# Patient Record
Sex: MALE | Race: WHITE | Employment: FULL TIME | URBAN - METROPOLITAN AREA
[De-identification: names, ages, dates, MRNs, and addresses within clinical notes are randomized per-mention and may not be internally consistent; named-entity substitution may affect disease eponyms.]

---

## 2017-07-25 ENCOUNTER — HOSPITAL ENCOUNTER (OUTPATIENT)
Age: 52
Discharge: HOME | End: 2017-07-25
Payer: COMMERCIAL

## 2017-07-25 DIAGNOSIS — I10 ESSENTIAL HYPERTENSION: ICD-10-CM

## 2017-07-25 LAB
ALBUMIN SERPL-MCNC: 4.1 G/DL (ref 3.5–5.2)
ALBUMIN/GLOBULIN RATIO: ABNORMAL (ref 1–2.5)
ALP BLD-CCNC: 71 U/L (ref 40–129)
ALT SERPL-CCNC: 39 U/L (ref 5–41)
ANION GAP SERPL CALCULATED.3IONS-SCNC: 12 MMOL/L (ref 9–17)
AST SERPL-CCNC: 28 U/L
BILIRUB SERPL-MCNC: 0.42 MG/DL (ref 0.3–1.2)
BUN BLDV-MCNC: 15 MG/DL (ref 6–20)
BUN/CREAT BLD: ABNORMAL (ref 9–20)
CALCIUM SERPL-MCNC: 9.2 MG/DL (ref 8.6–10.4)
CHLORIDE BLD-SCNC: 99 MMOL/L (ref 98–107)
CHOLESTEROL/HDL RATIO: 3.6
CHOLESTEROL: 154 MG/DL
CO2: 28 MMOL/L (ref 20–31)
CREAT SERPL-MCNC: 0.86 MG/DL (ref 0.7–1.2)
GFR AFRICAN AMERICAN: >60 ML/MIN
GFR NON-AFRICAN AMERICAN: >60 ML/MIN
GFR SERPL CREATININE-BSD FRML MDRD: ABNORMAL ML/MIN/{1.73_M2}
GFR SERPL CREATININE-BSD FRML MDRD: ABNORMAL ML/MIN/{1.73_M2}
GLUCOSE BLD-MCNC: 213 MG/DL (ref 70–99)
HCT VFR BLD CALC: 47.1 % (ref 41–53)
HDLC SERPL-MCNC: 43 MG/DL
HEMOGLOBIN: 15.9 G/DL (ref 13.5–17.5)
LDL CHOLESTEROL: 65 MG/DL (ref 0–130)
MCH RBC QN AUTO: 30 PG (ref 26–34)
MCHC RBC AUTO-ENTMCNC: 33.8 G/DL (ref 31–37)
MCV RBC AUTO: 88.9 FL (ref 80–100)
PDW BLD-RTO: 13.1 % (ref 11.5–14.9)
PLATELET # BLD: 191 K/UL (ref 150–450)
PMV BLD AUTO: 9 FL (ref 6–12)
POTASSIUM SERPL-SCNC: 4.8 MMOL/L (ref 3.7–5.3)
RBC # BLD: 5.3 M/UL (ref 4.5–5.9)
SODIUM BLD-SCNC: 139 MMOL/L (ref 135–144)
TOTAL PROTEIN: 7.8 G/DL (ref 6.4–8.3)
TRIGL SERPL-MCNC: 232 MG/DL
VLDLC SERPL CALC-MCNC: ABNORMAL MG/DL (ref 1–30)
WBC # BLD: 6.2 K/UL (ref 3.5–11)

## 2017-07-25 PROCEDURE — 80053 COMPREHEN METABOLIC PANEL: CPT

## 2017-07-25 PROCEDURE — 36415 COLL VENOUS BLD VENIPUNCTURE: CPT

## 2017-07-25 PROCEDURE — 85027 COMPLETE CBC AUTOMATED: CPT

## 2017-07-25 PROCEDURE — 80061 LIPID PANEL: CPT

## 2017-10-02 ENCOUNTER — HOSPITAL ENCOUNTER (OUTPATIENT)
Dept: MRI IMAGING | Facility: CLINIC | Age: 52
Discharge: HOME | End: 2017-10-02
Payer: COMMERCIAL

## 2017-10-02 DIAGNOSIS — M75.101 TEAR OF RIGHT ROTATOR CUFF, UNSPECIFIED TEAR EXTENT: ICD-10-CM

## 2017-10-02 PROCEDURE — 73221 MRI JOINT UPR EXTREM W/O DYE: CPT

## 2017-10-13 DIAGNOSIS — M25.511 RIGHT SHOULDER PAIN, UNSPECIFIED CHRONICITY: Primary | ICD-10-CM

## 2017-10-18 ENCOUNTER — OFFICE VISIT (OUTPATIENT)
Dept: ORTHOPEDIC SURGERY | Age: 52
End: 2017-10-18
Payer: COMMERCIAL

## 2017-10-18 VITALS
HEIGHT: 72 IN | SYSTOLIC BLOOD PRESSURE: 155 MMHG | BODY MASS INDEX: 39.28 KG/M2 | DIASTOLIC BLOOD PRESSURE: 95 MMHG | WEIGHT: 290 LBS | HEART RATE: 75 BPM

## 2017-10-18 DIAGNOSIS — M75.121 COMPLETE TEAR OF RIGHT ROTATOR CUFF: Primary | ICD-10-CM

## 2017-10-18 DIAGNOSIS — M75.41 IMPINGEMENT SYNDROME OF RIGHT SHOULDER: ICD-10-CM

## 2017-10-18 DIAGNOSIS — M19.019 AC (ACROMIOCLAVICULAR) JOINT ARTHRITIS: ICD-10-CM

## 2017-10-18 PROCEDURE — 99243 OFF/OP CNSLTJ NEW/EST LOW 30: CPT | Performed by: ORTHOPAEDIC SURGERY

## 2017-10-18 RX ORDER — IBUPROFEN 800 MG/1
800 TABLET ORAL EVERY 6 HOURS PRN
COMMUNITY
End: 2019-02-08 | Stop reason: SDUPTHER

## 2017-10-18 RX ORDER — CYCLOBENZAPRINE HCL 10 MG
10 TABLET ORAL 3 TIMES DAILY PRN
COMMUNITY
End: 2018-01-05 | Stop reason: ALTCHOICE

## 2017-10-18 ASSESSMENT — PATIENT HEALTH QUESTIONNAIRE - PHQ9
SUM OF ALL RESPONSES TO PHQ QUESTIONS 1-9: 0
1. LITTLE INTEREST OR PLEASURE IN DOING THINGS: 0
2. FEELING DOWN, DEPRESSED OR HOPELESS: 0
SUM OF ALL RESPONSES TO PHQ9 QUESTIONS 1 & 2: 0

## 2017-10-18 NOTE — LETTER
Dr. Jeni Garcia  [address] [facility] [address]  Dept: [phone]  Dept Fax: [phone]    10/19/17    Patient: Lorinda Meckel  YOB: 1965    Dear Dr. Ariadna Elizabeth,    I had the pleasure of seeing one of your patients, Graeme Rice today in the office. Below are the relevant portions of my assessment and plan of care. IMPRESSION:   1. Complete tear of right rotator cuff    2. AC (acromioclavicular) joint arthritis    3. Impingement syndrome of right shoulder      PLAN:   I reviewed the patient's physical exam and radiographic findings with him. We discussed right shoulder rotator cuff tear and treatment options. They can include anti-inflammatories, corticosteroid injections, physical therapy, activity modification, and surgical intervention. Patient would like to try nonoperative treatment of possible and we will submit a C9 to allow for the additional diagnosis of right shoulder impingement syndrome, right shoulder before meals arthritis, and right rotator cuff tear. In addition we're going to ask with the C9 for permission to do a corticosteroid injection. Work restrictions have been written for no repetitive over the shoulder activities and no more than 15-20 pounds lifting with the right upper extremity. Patient is going to continue with the anti-inflammatory medications and we will ask worker's comp to approve physical therapy. Thank you for allowing me to participate in the care of this patient. I will keep you updated on this patient's follow up and I look forward to serving you and your patients again in the future. Please don't hesitate to contact me at my mobile number [phone].       Malgorzata Shepherd

## 2017-10-18 NOTE — PROGRESS NOTES
[facility]  [facility]  [address] [place], [place] [address] [address] [place] [address]  Dept: [phone]    Ambulatory Orthopedic Consult      CHIEF COMPLAINT:    Chief Complaint   Patient presents with    Shoulder Pain     Right shoulder       HISTORY OF PRESENT ILLNESS:      The patient is a 47 y.o. left hand dominant male who is being seen at the request of   from [facility] for consultation and evaluation of right shoulder pain. The patient sustained an injury at work on 9/6/2017. He was picking up a 20 pound piece of beef and heard a pop to his shoulder. He has had shoulder pain ever since. He is currently working at work with no right-handed work. He did taking ibuprofen for pain and Flexeril at night. He's having trouble raising his arm over his head. He has a previous history of left shoulder rotator cuff repair by Dr. Alli Al and she had a previous right shoulder injection about 5 years ago by Dr. Alvarenga Argue marker was significant improvement in his symptoms at that time. Past Medical History:    Past Medical History:   Diagnosis Date    Abscess of axilla     Asthma     HTN (hypertension) 11/14/2013    Sciatica 3/18/2014       Past Surgical History:    Past Surgical History:   Procedure Laterality Date    ROTATOR CUFF REPAIR      L arm       Current Medications:   Current Outpatient Prescriptions   Medication Sig Dispense Refill    cyclobenzaprine (FLEXERIL) 10 MG tablet Take 10 mg by mouth 3 times daily as needed for Muscle spasms      ibuprofen (ADVIL;MOTRIN) 800 MG tablet Take 800 mg by mouth every 6 hours as needed for Pain      lisinopril (PRINIVIL;ZESTRIL) 20 MG tablet Take 1 tablet by mouth daily 30 tablet 3    SITagliptin-metFORMIN (JANUMET XR)  MG TB24 per extended release tablet Take 1 tablet by mouth daily 30 tablet 3    Compression Stockings MISC by Does not apply route.  Wear PRN for comfort 1 each 0     No current facility-administered medications reveals no significant shoulder girdle muscle atrophy, erythema, warmth, skin lesions, signs of infection. Tenderness to the anterolateral aspect of the shoulder is appreciated. No palpable deformity is noted. Active flexion is 100 degrees. Active functional reach is  Right flank  . Active abduction is 100 degrees. External rotation is 40. Internal rotation is 20 . A  PositiveHawkins test is appreciated. Positive supraspinatus test is appreciated. A positive impingement test is noted. No gross shoulder instability is appreciated. A negative apprehension test is noted. Negative Rabun's test is appreciated. Rotator cuff muscle strength testing shows weakness with external rotationstrength testing. Sensation to C5, C6, C7, C8, T1 dermatomes appears to be intact and symmetric bilaterally. Patient has full range of motion of the cervical spine and elbow.     Radiology:     Narrative   EXAMINATION:   MRI OF THE RIGHT SHOULDER WITHOUT CONTRAST   10/2/2017 2:32 pm       TECHNIQUE:   Multiplanar multisequence MRI of the right shoulder was performed without the   administration of intravenous contrast.       COMPARISON:   None.       HISTORY:   ORDERING SYSTEM PROVIDED HISTORY: Tear of right rotator cuff, unspecified   tear extent   TECHNOLOGIST PROVIDED HISTORY:   Reason for exam:->right shoulder pain   Ordering Physician Provided Reason for Exam: rt shoulder pain/strain   Acuity: Acute   Type of Exam: Initial       FINDINGS:   Examination is significant for a full-thickness, essentially complete tear of   the supraspinatus tendon spanning approximately 2 cm in anterior-posterior   dimension with approximately 1.5 cm of retraction.  The retracted tendon   demonstrates diffuse tendinopathy and intrasubstance tearing.  The   supraspinatus muscle maintains normal bulk and signal intensity, without   significant fatty infiltration.  Infraspinatus tendinopathy and   intrasubstance tearing noted without

## 2017-10-19 ASSESSMENT — ENCOUNTER SYMPTOMS
SHORTNESS OF BREATH: 0
ABDOMINAL PAIN: 0
ROS SKIN COMMENTS: NEGATIVE FOR RASH
EYE DISCHARGE: 0

## 2017-10-20 ENCOUNTER — TELEPHONE (OUTPATIENT)
Dept: ORTHOPEDIC SURGERY | Age: 52
End: 2017-10-20

## 2017-10-20 NOTE — TELEPHONE ENCOUNTER
Tommy Stuart from [facility] ask that we send recent notes, C9 for diagnosis and injections. Patient contacted her to let her know he was going to have these.    Her fax number is [phone]    Phone number is [phone]  His claim number is [id number]

## 2017-10-27 ENCOUNTER — HOSPITAL ENCOUNTER (OUTPATIENT)
Age: 52
Setting detail: SPECIMEN
Discharge: HOME | End: 2017-10-27
Payer: COMMERCIAL

## 2017-10-27 DIAGNOSIS — E11.9 TYPE 2 DIABETES MELLITUS WITHOUT COMPLICATION, WITHOUT LONG-TERM CURRENT USE OF INSULIN (HCC): ICD-10-CM

## 2017-10-27 LAB
CREATININE URINE: 111.2 MG/DL (ref 39–259)
MICROALBUMIN/CREAT 24H UR: <12 MG/L
MICROALBUMIN/CREAT UR-RTO: NORMAL MCG/MG CREAT

## 2017-11-01 ENCOUNTER — OFFICE VISIT (OUTPATIENT)
Dept: ORTHOPEDIC SURGERY | Age: 52
End: 2017-11-01
Payer: COMMERCIAL

## 2017-11-01 VITALS — HEIGHT: 72 IN | WEIGHT: 287.92 LBS | BODY MASS INDEX: 39 KG/M2

## 2017-11-01 DIAGNOSIS — M19.019 AC (ACROMIOCLAVICULAR) JOINT ARTHRITIS: ICD-10-CM

## 2017-11-01 DIAGNOSIS — M75.41 IMPINGEMENT SYNDROME OF RIGHT SHOULDER: ICD-10-CM

## 2017-11-01 DIAGNOSIS — M75.121 COMPLETE TEAR OF RIGHT ROTATOR CUFF: Primary | ICD-10-CM

## 2017-11-01 PROCEDURE — 20610 DRAIN/INJ JOINT/BURSA W/O US: CPT | Performed by: ORTHOPAEDIC SURGERY

## 2017-11-01 RX ORDER — BUPIVACAINE HYDROCHLORIDE 2.5 MG/ML
2 INJECTION, SOLUTION INFILTRATION; PERINEURAL ONCE
Status: COMPLETED | OUTPATIENT
Start: 2017-11-01 | End: 2017-11-01

## 2017-11-01 RX ORDER — METHYLPREDNISOLONE ACETATE 80 MG/ML
80 INJECTION, SUSPENSION INTRA-ARTICULAR; INTRALESIONAL; INTRAMUSCULAR; SOFT TISSUE ONCE
Status: COMPLETED | OUTPATIENT
Start: 2017-11-01 | End: 2017-11-01

## 2017-11-01 RX ORDER — IBUPROFEN 800 MG/1
800 TABLET ORAL EVERY 8 HOURS PRN
Qty: 90 TABLET | Refills: 2 | Status: SHIPPED | OUTPATIENT
Start: 2017-11-01 | End: 2018-01-09 | Stop reason: SDUPTHER

## 2017-11-01 RX ADMIN — BUPIVACAINE HYDROCHLORIDE 5 MG: 2.5 INJECTION, SOLUTION INFILTRATION; PERINEURAL at 15:47

## 2017-11-01 RX ADMIN — METHYLPREDNISOLONE ACETATE 80 MG: 80 INJECTION, SUSPENSION INTRA-ARTICULAR; INTRALESIONAL; INTRAMUSCULAR; SOFT TISSUE at 15:48

## 2017-11-01 ASSESSMENT — ENCOUNTER SYMPTOMS
DIARRHEA: 0
CONSTIPATION: 0
TROUBLE SWALLOWING: 0
NAUSEA: 0
SHORTNESS OF BREATH: 0
VOMITING: 0
VOICE CHANGE: 0
CHOKING: 0
COUGH: 0
WHEEZING: 0
ABDOMINAL PAIN: 0

## 2017-11-01 NOTE — PROGRESS NOTES
[address] [place] [address]  Dept: [phone]  Dept Fax: [phone]        Ambulatory Follow Up      Subjective:   Robson Dong is a 47y.o. year old male who presents to our office today for routine followup regarding his   1. Complete tear of right rotator cuff    2. Impingement syndrome of right shoulder    3. AC (acromioclavicular) joint arthritis    . Chief Complaint   Patient presents with    Shoulder Pain     right DOI/[facility] 9/6/2017       HPI-Patient is here today for a right shoulder cortisone injection that was approved by workers comp. Patient initial date of injury was 09/06/2017. Patient has been on light duty since the injury that includes no repitive motion, no overhead activity and a 15-20 pound limit to right upper extremity. Patient takes motrin as needed, especially at work if he is working a lot. Patient rates pain 4/10. He has had some improvement so far and is looking forward to the corticosteroid injection today. Review of Systems   Constitutional: Negative for fever. HENT: Negative for ear discharge, ear pain, hearing loss, tinnitus, trouble swallowing and voice change. Respiratory: Negative for cough, choking, shortness of breath and wheezing. Cardiovascular: Negative for chest pain, palpitations and leg swelling. Gastrointestinal: Negative for abdominal pain, constipation, diarrhea, nausea and vomiting. Musculoskeletal: Positive for arthralgias (rt shoulder). Neurological: Negative for dizziness. I have reviewed the CC, HPI, ROS, PMH, FHX, Social History. I agree with the documentation provided by other staff, residents and have reviewed their documentation prior to providing my signature indicating agreement. Objective :   Ht 6' 0.01\" (1.829 m)   Wt 287 lb 14.7 oz (130.6 kg)   BMI 39.04 kg/m²  Body mass index is 39.04 kg/m².   General: Robson Dong is a 47 y.o. male who is alert and oriented and sitting comfortably in our office. Ortho Exam  MS:  No outward deformity to the right shoulder is appreciated. There is no significant effusion to the right shoulder and no signs of infection to the right shoulder. Neuro: alert. oriented  Eyes: Extra-ocular muscles intact  Mouth: Oral mucosa moist. No perioral lesions  Pulm: Respirations unlabored and regular. Skin: warm, well perfused  Psych:   Patient has good fund of knowledge and displays understanging of exam, diagnosis, and plan. SHOULDER INJECTION PROCEDURE NOTE:  The patient was identified. The right shoulder was confirmed with the patient. After a sterile prep with Betadine the shoulder  was injected using a posterior approach to the subacromial space with a mixture of 2 mL of 0.25% Marcaine and 80 mg of Depo-Medrol. Patient tolerated the procedure well without post injection complications. I instructed the patient to call our office immediately if they have any swelling or increased pain at the injection site. Assessment:      1. Complete tear of right rotator cuff    2. Impingement syndrome of right shoulder    3. AC (acromioclavicular) joint arthritis       Plan:      The patient tolerated a subacromial corticosteroid injection to the right shoulder today without complications. Patient to continue with HEP and therapy. Follow up in 6 weeks. Follow up:Return in about 6 weeks (around 12/13/2017). Renae Ortiz LPN am scribing for and in the presence of Dr Joaquim Moreland.   11/6/2017  10:21 AM            Orders Placed This Encounter   Medications    ibuprofen (ADVIL;MOTRIN) 800 MG tablet     Sig: Take 1 tablet by mouth every 8 hours as needed for Pain     Dispense:  90 tablet     Refill:  2    methylPREDNISolone acetate (DEPO-MEDROL) injection 80 mg    bupivacaine (MARCAINE) 0.25 % injection 5 mg          Orders Placed This Encounter   Procedures    WA ARTHROCENTESIS ASPIR&/INJ MAJOR JT/BURSA W/O US       I

## 2017-12-08 ENCOUNTER — OFFICE VISIT (OUTPATIENT)
Dept: ORTHOPEDIC SURGERY | Age: 52
End: 2017-12-08
Payer: COMMERCIAL

## 2017-12-08 VITALS — WEIGHT: 292 LBS | BODY MASS INDEX: 39.55 KG/M2 | HEIGHT: 72 IN

## 2017-12-08 DIAGNOSIS — M75.01 ADHESIVE CAPSULITIS OF RIGHT SHOULDER: ICD-10-CM

## 2017-12-08 DIAGNOSIS — M75.121 COMPLETE TEAR OF RIGHT ROTATOR CUFF: Primary | ICD-10-CM

## 2017-12-08 DIAGNOSIS — M75.41 IMPINGEMENT SYNDROME OF RIGHT SHOULDER: ICD-10-CM

## 2017-12-08 PROCEDURE — 99213 OFFICE O/P EST LOW 20 MIN: CPT | Performed by: ORTHOPAEDIC SURGERY

## 2017-12-08 ASSESSMENT — ENCOUNTER SYMPTOMS
NAUSEA: 0
VOICE CHANGE: 0
DIARRHEA: 0
ABDOMINAL PAIN: 0
TROUBLE SWALLOWING: 0
CHOKING: 0
SHORTNESS OF BREATH: 0
CONSTIPATION: 0
WHEEZING: 0
COUGH: 0
VOMITING: 0

## 2018-01-05 ENCOUNTER — OFFICE VISIT (OUTPATIENT)
Dept: ORTHOPEDIC SURGERY | Age: 53
End: 2018-01-05
Payer: COMMERCIAL

## 2018-01-05 VITALS
BODY MASS INDEX: 39.55 KG/M2 | SYSTOLIC BLOOD PRESSURE: 138 MMHG | WEIGHT: 292 LBS | DIASTOLIC BLOOD PRESSURE: 92 MMHG | HEIGHT: 72 IN

## 2018-01-05 DIAGNOSIS — M75.01 ADHESIVE CAPSULITIS OF RIGHT SHOULDER: ICD-10-CM

## 2018-01-05 DIAGNOSIS — M75.121 COMPLETE TEAR OF RIGHT ROTATOR CUFF: Primary | ICD-10-CM

## 2018-01-05 DIAGNOSIS — M75.41 IMPINGEMENT SYNDROME OF RIGHT SHOULDER: ICD-10-CM

## 2018-01-05 DIAGNOSIS — Z01.818 PRE-OP TESTING: ICD-10-CM

## 2018-01-05 PROCEDURE — 99213 OFFICE O/P EST LOW 20 MIN: CPT | Performed by: ORTHOPAEDIC SURGERY

## 2018-01-05 ASSESSMENT — ENCOUNTER SYMPTOMS
NAUSEA: 0
CONSTIPATION: 0
COUGH: 0
DIARRHEA: 0

## 2018-01-05 NOTE — PROGRESS NOTES
is appreciated. Positive supraspinatus test is appreciated. A positive impingement test is noted. No gross shoulder instability is appreciated. A negative apprehension test is noted. Negative Buckingham's test is appreciated. Rotator cuff muscle strength testing shows weakness with external rotationstrength testing. Sensation to C5, C6, C7, C8, T1 dermatomes appears to be intact and symmetric bilaterally. Patient has full range of motion of the cervical spine and elbow. Neuro: alert. oriented  Eyes: Extra-ocular muscles intact  Mouth: Oral mucosa moist. No perioral lesions  Pulm: Respirations unlabored and regular. Skin: warm, well perfused  Psych:   Patient has good fund of knowledge and displays understanging of exam, diagnosis, and plan. Assessment:      1. Complete tear of right rotator cuff    2. Impingement syndrome of right shoulder    3. Adhesive capsulitis of right shoulder    4. Pre-op testing       Plan:      After exhausting all conservative measures with physical therapy, antiinflammatories, and corticosteroid injection patient would like to go forth with right shoulder arthroscopy. Follow up two weeks post op. The risks/benefits, alternatives, and potential complications have been discussed with the patient. We also had a long discussion regarding the procedure itself and the expectations of the recovery. All question and concerns were addressed. No guarantees were made. The patient was instructed to call our office if any questions or concerns. Follow up: Two weeks post operatively. Liam Benoit RN am scribing for and in the presence of Dr. Jamal Bernal  1/8/2018 10:10 AM      No orders of the defined types were placed in this encounter. I have reviewed and made changes accordingly to the work scribed by Lilliam Chand RN. The documentation accurately reflects work and decisions made by me.   I have also reviewed documentation completed

## 2018-01-09 ENCOUNTER — HOSPITAL ENCOUNTER (OUTPATIENT)
Dept: GENERAL RADIOLOGY | Age: 53
Discharge: HOME | End: 2018-01-09
Payer: COMMERCIAL

## 2018-01-09 ENCOUNTER — HOSPITAL ENCOUNTER (OUTPATIENT)
Dept: PREADMISSION TESTING | Age: 53
Discharge: HOME | End: 2018-01-09
Payer: COMMERCIAL

## 2018-01-09 VITALS
OXYGEN SATURATION: 95 % | DIASTOLIC BLOOD PRESSURE: 96 MMHG | HEIGHT: 72 IN | WEIGHT: 289.24 LBS | BODY MASS INDEX: 39.18 KG/M2 | RESPIRATION RATE: 20 BRPM | TEMPERATURE: 98.5 F | HEART RATE: 83 BPM | SYSTOLIC BLOOD PRESSURE: 155 MMHG

## 2018-01-09 DIAGNOSIS — Z01.818 PRE-OP TESTING: ICD-10-CM

## 2018-01-09 LAB
ALBUMIN SERPL-MCNC: 4.3 G/DL (ref 3.5–5.2)
ALBUMIN/GLOBULIN RATIO: 1.3 (ref 1–2.5)
ALP BLD-CCNC: 55 U/L (ref 40–129)
ALT SERPL-CCNC: 41 U/L (ref 5–41)
ANION GAP SERPL CALCULATED.3IONS-SCNC: 15 MMOL/L (ref 9–17)
AST SERPL-CCNC: 32 U/L
BILIRUB SERPL-MCNC: 0.58 MG/DL (ref 0.3–1.2)
BILIRUBIN URINE: NEGATIVE
BUN BLDV-MCNC: 15 MG/DL (ref 6–20)
BUN/CREAT BLD: ABNORMAL (ref 9–20)
CALCIUM SERPL-MCNC: 9.5 MG/DL (ref 8.6–10.4)
CHLORIDE BLD-SCNC: 97 MMOL/L (ref 98–107)
CO2: 26 MMOL/L (ref 20–31)
COLOR: YELLOW
COMMENT UA: NORMAL
CREAT SERPL-MCNC: 0.92 MG/DL (ref 0.7–1.2)
EKG ATRIAL RATE: 77 BPM
EKG P AXIS: 55 DEGREES
EKG P-R INTERVAL: 148 MS
EKG Q-T INTERVAL: 384 MS
EKG QRS DURATION: 106 MS
EKG QTC CALCULATION (BAZETT): 434 MS
EKG R AXIS: 8 DEGREES
EKG T AXIS: 35 DEGREES
EKG VENTRICULAR RATE: 77 BPM
GFR AFRICAN AMERICAN: >60 ML/MIN
GFR NON-AFRICAN AMERICAN: >60 ML/MIN
GFR SERPL CREATININE-BSD FRML MDRD: ABNORMAL ML/MIN/{1.73_M2}
GFR SERPL CREATININE-BSD FRML MDRD: ABNORMAL ML/MIN/{1.73_M2}
GLUCOSE BLD-MCNC: 192 MG/DL (ref 70–99)
GLUCOSE URINE: NEGATIVE
HCT VFR BLD CALC: 45.5 % (ref 40.7–50.3)
HEMOGLOBIN: 15.2 G/DL (ref 13–17)
KETONES, URINE: NEGATIVE
LEUKOCYTE ESTERASE, URINE: NEGATIVE
MCH RBC QN AUTO: 29.6 PG (ref 25.2–33.5)
MCHC RBC AUTO-ENTMCNC: 33.4 G/DL (ref 28.4–34.8)
MCV RBC AUTO: 88.5 FL (ref 82.6–102.9)
NITRITE, URINE: NEGATIVE
PDW BLD-RTO: 12.6 % (ref 11.8–14.4)
PH UA: 5 (ref 5–8)
PLATELET # BLD: 212 K/UL (ref 138–453)
PMV BLD AUTO: 10.5 FL (ref 8.1–13.5)
POTASSIUM SERPL-SCNC: 4.2 MMOL/L (ref 3.7–5.3)
PROTEIN UA: NEGATIVE
RBC # BLD: 5.14 M/UL (ref 4.21–5.77)
SODIUM BLD-SCNC: 138 MMOL/L (ref 135–144)
SPECIFIC GRAVITY UA: 1.01 (ref 1–1.03)
TOTAL PROTEIN: 7.6 G/DL (ref 6.4–8.3)
TURBIDITY: CLEAR
URINE HGB: NEGATIVE
UROBILINOGEN, URINE: NORMAL
WBC # BLD: 7.4 K/UL (ref 3.5–11.3)

## 2018-01-09 PROCEDURE — 71046 X-RAY EXAM CHEST 2 VIEWS: CPT

## 2018-01-09 PROCEDURE — 85027 COMPLETE CBC AUTOMATED: CPT

## 2018-01-09 PROCEDURE — 81003 URINALYSIS AUTO W/O SCOPE: CPT

## 2018-01-09 PROCEDURE — 93005 ELECTROCARDIOGRAM TRACING: CPT

## 2018-01-09 PROCEDURE — 36415 COLL VENOUS BLD VENIPUNCTURE: CPT

## 2018-01-09 PROCEDURE — 80053 COMPREHEN METABOLIC PANEL: CPT

## 2018-01-09 RX ORDER — SODIUM CHLORIDE, SODIUM LACTATE, POTASSIUM CHLORIDE, CALCIUM CHLORIDE 600; 310; 30; 20 MG/100ML; MG/100ML; MG/100ML; MG/100ML
1000 INJECTION, SOLUTION INTRAVENOUS CONTINUOUS
Status: CANCELLED | OUTPATIENT
Start: 2018-01-09

## 2018-01-09 ASSESSMENT — PAIN DESCRIPTION - FREQUENCY: FREQUENCY: CONTINUOUS

## 2018-01-09 ASSESSMENT — PAIN SCALES - GENERAL: PAINLEVEL_OUTOF10: 5

## 2018-01-09 ASSESSMENT — PAIN DESCRIPTION - LOCATION: LOCATION: SHOULDER

## 2018-01-09 ASSESSMENT — PAIN DESCRIPTION - ORIENTATION: ORIENTATION: RIGHT

## 2018-01-11 ENCOUNTER — ANESTHESIA EVENT (OUTPATIENT)
Dept: OPERATING ROOM | Age: 53
End: 2018-01-11
Payer: COMMERCIAL

## 2018-01-12 ENCOUNTER — ANESTHESIA (OUTPATIENT)
Dept: OPERATING ROOM | Age: 53
End: 2018-01-12
Payer: COMMERCIAL

## 2018-01-12 ENCOUNTER — HOSPITAL ENCOUNTER (OUTPATIENT)
Age: 53
Setting detail: OUTPATIENT SURGERY
Discharge: HOME | End: 2018-01-12
Attending: ORTHOPAEDIC SURGERY | Admitting: ORTHOPAEDIC SURGERY
Payer: COMMERCIAL

## 2018-01-12 VITALS
HEIGHT: 72 IN | BODY MASS INDEX: 38.64 KG/M2 | RESPIRATION RATE: 22 BRPM | DIASTOLIC BLOOD PRESSURE: 88 MMHG | SYSTOLIC BLOOD PRESSURE: 140 MMHG | OXYGEN SATURATION: 96 % | TEMPERATURE: 97.2 F | WEIGHT: 285.27 LBS | HEART RATE: 88 BPM

## 2018-01-12 VITALS — SYSTOLIC BLOOD PRESSURE: 155 MMHG | DIASTOLIC BLOOD PRESSURE: 77 MMHG | OXYGEN SATURATION: 94 % | TEMPERATURE: 96.9 F

## 2018-01-12 DIAGNOSIS — G89.18 POST-OPERATIVE PAIN: Primary | ICD-10-CM

## 2018-01-12 LAB
GLUCOSE BLD-MCNC: 145 MG/DL (ref 75–110)
GLUCOSE BLD-MCNC: 159 MG/DL (ref 75–110)

## 2018-01-12 PROCEDURE — 2580000003 HC RX 258: Performed by: ANESTHESIOLOGY

## 2018-01-12 PROCEDURE — 6360000002 HC RX W HCPCS: Performed by: ANESTHESIOLOGY

## 2018-01-12 PROCEDURE — 2720000010 HC SURG SUPPLY STERILE: Performed by: ORTHOPAEDIC SURGERY

## 2018-01-12 PROCEDURE — 7100000011 HC PHASE II RECOVERY - ADDTL 15 MIN: Performed by: ORTHOPAEDIC SURGERY

## 2018-01-12 PROCEDURE — 3700000001 HC ADD 15 MINUTES (ANESTHESIA): Performed by: ORTHOPAEDIC SURGERY

## 2018-01-12 PROCEDURE — 2720000003 HC MISC SUTURE/STAPLES/RELOADS/ETC: Performed by: ORTHOPAEDIC SURGERY

## 2018-01-12 PROCEDURE — 3600000014 HC SURGERY LEVEL 4 ADDTL 15MIN: Performed by: ORTHOPAEDIC SURGERY

## 2018-01-12 PROCEDURE — 2500000003 HC RX 250 WO HCPCS: Performed by: SPECIALIST

## 2018-01-12 PROCEDURE — 7100000010 HC PHASE II RECOVERY - FIRST 15 MIN: Performed by: ORTHOPAEDIC SURGERY

## 2018-01-12 PROCEDURE — 7100000001 HC PACU RECOVERY - ADDTL 15 MIN: Performed by: ORTHOPAEDIC SURGERY

## 2018-01-12 PROCEDURE — 2580000003 HC RX 258: Performed by: SPECIALIST

## 2018-01-12 PROCEDURE — 29827 SHO ARTHRS SRG RT8TR CUF RPR: CPT | Performed by: ORTHOPAEDIC SURGERY

## 2018-01-12 PROCEDURE — 6370000000 HC RX 637 (ALT 250 FOR IP): Performed by: ANESTHESIOLOGY

## 2018-01-12 PROCEDURE — 3700000000 HC ANESTHESIA ATTENDED CARE: Performed by: ORTHOPAEDIC SURGERY

## 2018-01-12 PROCEDURE — 3600000004 HC SURGERY LEVEL 4 BASE: Performed by: ORTHOPAEDIC SURGERY

## 2018-01-12 PROCEDURE — 82947 ASSAY GLUCOSE BLOOD QUANT: CPT

## 2018-01-12 PROCEDURE — 7100000000 HC PACU RECOVERY - FIRST 15 MIN: Performed by: ORTHOPAEDIC SURGERY

## 2018-01-12 PROCEDURE — C1713 ANCHOR/SCREW BN/BN,TIS/BN: HCPCS | Performed by: ORTHOPAEDIC SURGERY

## 2018-01-12 PROCEDURE — 64415 NJX AA&/STRD BRCH PLXS IMG: CPT | Performed by: ANESTHESIOLOGY

## 2018-01-12 PROCEDURE — 6360000002 HC RX W HCPCS

## 2018-01-12 PROCEDURE — 6360000002 HC RX W HCPCS: Performed by: SPECIALIST

## 2018-01-12 DEVICE — ANCHOR SUT L19.1MM DIA5.5MM PEEK FULL THRD KNOTLESS EYELET: Type: IMPLANTABLE DEVICE | Status: FUNCTIONAL

## 2018-01-12 RX ORDER — ROCURONIUM BROMIDE 10 MG/ML
INJECTION, SOLUTION INTRAVENOUS PRN
Status: DISCONTINUED | OUTPATIENT
Start: 2018-01-12 | End: 2018-01-12 | Stop reason: SDUPTHER

## 2018-01-12 RX ORDER — FENTANYL CITRATE 50 UG/ML
100 INJECTION, SOLUTION INTRAMUSCULAR; INTRAVENOUS ONCE
Status: COMPLETED | OUTPATIENT
Start: 2018-01-12 | End: 2018-01-12

## 2018-01-12 RX ORDER — PROPOFOL 10 MG/ML
INJECTION, EMULSION INTRAVENOUS PRN
Status: DISCONTINUED | OUTPATIENT
Start: 2018-01-12 | End: 2018-01-12 | Stop reason: SDUPTHER

## 2018-01-12 RX ORDER — OXYCODONE HYDROCHLORIDE AND ACETAMINOPHEN 5; 325 MG/1; MG/1
TABLET ORAL
Qty: 50 TABLET | Refills: 0 | Status: SHIPPED | OUTPATIENT
Start: 2018-01-12 | End: 2018-01-19

## 2018-01-12 RX ORDER — OXYCODONE HYDROCHLORIDE AND ACETAMINOPHEN 5; 325 MG/1; MG/1
1 TABLET ORAL ONCE
Status: COMPLETED | OUTPATIENT
Start: 2018-01-12 | End: 2018-01-12

## 2018-01-12 RX ORDER — GLYCOPYRROLATE 0.2 MG/ML
INJECTION INTRAMUSCULAR; INTRAVENOUS PRN
Status: DISCONTINUED | OUTPATIENT
Start: 2018-01-12 | End: 2018-01-12 | Stop reason: SDUPTHER

## 2018-01-12 RX ORDER — ONDANSETRON 2 MG/ML
INJECTION INTRAMUSCULAR; INTRAVENOUS PRN
Status: DISCONTINUED | OUTPATIENT
Start: 2018-01-12 | End: 2018-01-12 | Stop reason: SDUPTHER

## 2018-01-12 RX ORDER — MIDAZOLAM HYDROCHLORIDE 1 MG/ML
2 INJECTION INTRAMUSCULAR; INTRAVENOUS ONCE
Status: COMPLETED | OUTPATIENT
Start: 2018-01-12 | End: 2018-01-12

## 2018-01-12 RX ORDER — FENTANYL CITRATE 50 UG/ML
25 INJECTION, SOLUTION INTRAMUSCULAR; INTRAVENOUS EVERY 5 MIN PRN
Status: DISCONTINUED | OUTPATIENT
Start: 2018-01-12 | End: 2018-01-12 | Stop reason: HOSPADM

## 2018-01-12 RX ORDER — FENTANYL CITRATE 50 UG/ML
INJECTION, SOLUTION INTRAMUSCULAR; INTRAVENOUS PRN
Status: DISCONTINUED | OUTPATIENT
Start: 2018-01-12 | End: 2018-01-12 | Stop reason: SDUPTHER

## 2018-01-12 RX ORDER — MEPERIDINE HYDROCHLORIDE 50 MG/ML
12.5 INJECTION INTRAMUSCULAR; INTRAVENOUS; SUBCUTANEOUS EVERY 5 MIN PRN
Status: DISCONTINUED | OUTPATIENT
Start: 2018-01-12 | End: 2018-01-12 | Stop reason: HOSPADM

## 2018-01-12 RX ORDER — LIDOCAINE HYDROCHLORIDE 10 MG/ML
INJECTION, SOLUTION EPIDURAL; INFILTRATION; INTRACAUDAL; PERINEURAL PRN
Status: DISCONTINUED | OUTPATIENT
Start: 2018-01-12 | End: 2018-01-12 | Stop reason: SDUPTHER

## 2018-01-12 RX ORDER — FENTANYL CITRATE 50 UG/ML
50 INJECTION, SOLUTION INTRAMUSCULAR; INTRAVENOUS EVERY 5 MIN PRN
Status: DISCONTINUED | OUTPATIENT
Start: 2018-01-12 | End: 2018-01-12 | Stop reason: HOSPADM

## 2018-01-12 RX ORDER — ONDANSETRON 2 MG/ML
4 INJECTION INTRAMUSCULAR; INTRAVENOUS
Status: DISCONTINUED | OUTPATIENT
Start: 2018-01-12 | End: 2018-01-12 | Stop reason: HOSPADM

## 2018-01-12 RX ORDER — DEXAMETHASONE SODIUM PHOSPHATE 10 MG/ML
INJECTION INTRAMUSCULAR; INTRAVENOUS PRN
Status: DISCONTINUED | OUTPATIENT
Start: 2018-01-12 | End: 2018-01-12 | Stop reason: SDUPTHER

## 2018-01-12 RX ORDER — ROPIVACAINE HYDROCHLORIDE 5 MG/ML
40 INJECTION, SOLUTION EPIDURAL; INFILTRATION; PERINEURAL ONCE
Status: COMPLETED | OUTPATIENT
Start: 2018-01-12 | End: 2018-01-12

## 2018-01-12 RX ORDER — SODIUM CHLORIDE, SODIUM LACTATE, POTASSIUM CHLORIDE, CALCIUM CHLORIDE 600; 310; 30; 20 MG/100ML; MG/100ML; MG/100ML; MG/100ML
1000 INJECTION, SOLUTION INTRAVENOUS CONTINUOUS
Status: DISCONTINUED | OUTPATIENT
Start: 2018-01-12 | End: 2018-01-12 | Stop reason: HOSPADM

## 2018-01-12 RX ADMIN — ROCURONIUM BROMIDE 50 MG: 10 INJECTION INTRAVENOUS at 15:00

## 2018-01-12 RX ADMIN — LIDOCAINE HYDROCHLORIDE 50 MG: 10 INJECTION, SOLUTION EPIDURAL; INFILTRATION; INTRACAUDAL; PERINEURAL at 15:00

## 2018-01-12 RX ADMIN — PHENYLEPHRINE HYDROCHLORIDE 100 MCG/MIN: 10 INJECTION INTRAVENOUS at 15:20

## 2018-01-12 RX ADMIN — GLYCOPYRROLATE 0.8 MG: 0.2 INJECTION INTRAMUSCULAR; INTRAVENOUS at 16:33

## 2018-01-12 RX ADMIN — ROPIVACAINE HYDROCHLORIDE 30 ML: 5 INJECTION, SOLUTION EPIDURAL; INFILTRATION; PERINEURAL at 13:55

## 2018-01-12 RX ADMIN — FENTANYL CITRATE 100 MCG: 50 INJECTION INTRAMUSCULAR; INTRAVENOUS at 13:52

## 2018-01-12 RX ADMIN — DEXAMETHASONE SODIUM PHOSPHATE 10 MG: 10 INJECTION INTRAMUSCULAR; INTRAVENOUS at 15:11

## 2018-01-12 RX ADMIN — ONDANSETRON 4 MG: 2 INJECTION INTRAMUSCULAR; INTRAVENOUS at 16:33

## 2018-01-12 RX ADMIN — NEOSTIGMINE METHYLSULFATE 5 MG: 1 INJECTION, SOLUTION INTRAMUSCULAR; INTRAVENOUS; SUBCUTANEOUS at 16:33

## 2018-01-12 RX ADMIN — PHENYLEPHRINE HYDROCHLORIDE 100 MCG: 10 INJECTION INTRAVENOUS at 16:10

## 2018-01-12 RX ADMIN — FENTANYL CITRATE 100 MCG: 50 INJECTION INTRAMUSCULAR; INTRAVENOUS at 15:00

## 2018-01-12 RX ADMIN — PROPOFOL 200 MG: 10 INJECTION, EMULSION INTRAVENOUS at 15:00

## 2018-01-12 RX ADMIN — SODIUM CHLORIDE, POTASSIUM CHLORIDE, SODIUM LACTATE AND CALCIUM CHLORIDE 1000 ML: 600; 310; 30; 20 INJECTION, SOLUTION INTRAVENOUS at 12:46

## 2018-01-12 RX ADMIN — OXYCODONE HYDROCHLORIDE AND ACETAMINOPHEN 1 TABLET: 5; 325 TABLET ORAL at 20:37

## 2018-01-12 RX ADMIN — PHENYLEPHRINE HYDROCHLORIDE 100 MCG: 10 INJECTION INTRAVENOUS at 15:15

## 2018-01-12 RX ADMIN — ROCURONIUM BROMIDE 50 MG: 10 INJECTION INTRAVENOUS at 15:30

## 2018-01-12 RX ADMIN — MIDAZOLAM HYDROCHLORIDE 2 MG: 1 INJECTION, SOLUTION INTRAMUSCULAR; INTRAVENOUS at 13:52

## 2018-01-12 RX ADMIN — PHENYLEPHRINE HYDROCHLORIDE 300 MCG: 10 INJECTION INTRAVENOUS at 15:20

## 2018-01-12 ASSESSMENT — PAIN DESCRIPTION - FREQUENCY: FREQUENCY: INTERMITTENT

## 2018-01-12 ASSESSMENT — PULMONARY FUNCTION TESTS
PIF_VALUE: 23
PIF_VALUE: 4
PIF_VALUE: 22
PIF_VALUE: 28
PIF_VALUE: 1
PIF_VALUE: 25
PIF_VALUE: 23
PIF_VALUE: 22
PIF_VALUE: 4
PIF_VALUE: 24
PIF_VALUE: 35
PIF_VALUE: 22
PIF_VALUE: 26
PIF_VALUE: 3
PIF_VALUE: 23
PIF_VALUE: 23
PIF_VALUE: 1
PIF_VALUE: 25
PIF_VALUE: 23
PIF_VALUE: 24
PIF_VALUE: 24
PIF_VALUE: 22
PIF_VALUE: 22
PIF_VALUE: 1
PIF_VALUE: 23
PIF_VALUE: 1
PIF_VALUE: 1
PIF_VALUE: 28
PIF_VALUE: 22
PIF_VALUE: 23
PIF_VALUE: 22
PIF_VALUE: 22
PIF_VALUE: 1
PIF_VALUE: 23
PIF_VALUE: 24
PIF_VALUE: 40
PIF_VALUE: 25
PIF_VALUE: 38
PIF_VALUE: 22
PIF_VALUE: 22
PIF_VALUE: 24
PIF_VALUE: 23
PIF_VALUE: 1
PIF_VALUE: 27
PIF_VALUE: 24
PIF_VALUE: 23
PIF_VALUE: 1
PIF_VALUE: 22
PIF_VALUE: 23
PIF_VALUE: 25
PIF_VALUE: 1
PIF_VALUE: 1
PIF_VALUE: 3
PIF_VALUE: 34
PIF_VALUE: 24
PIF_VALUE: 24
PIF_VALUE: 29
PIF_VALUE: 1
PIF_VALUE: 22
PIF_VALUE: 24
PIF_VALUE: 23
PIF_VALUE: 22
PIF_VALUE: 1
PIF_VALUE: 25
PIF_VALUE: 23
PIF_VALUE: 23
PIF_VALUE: 22
PIF_VALUE: 23
PIF_VALUE: 1
PIF_VALUE: 22
PIF_VALUE: 23
PIF_VALUE: 35
PIF_VALUE: 3
PIF_VALUE: 31
PIF_VALUE: 23
PIF_VALUE: 23
PIF_VALUE: 22
PIF_VALUE: 22
PIF_VALUE: 2
PIF_VALUE: 23
PIF_VALUE: 22
PIF_VALUE: 23
PIF_VALUE: 22
PIF_VALUE: 2
PIF_VALUE: 23
PIF_VALUE: 23
PIF_VALUE: 35
PIF_VALUE: 26
PIF_VALUE: 23
PIF_VALUE: 23
PIF_VALUE: 25
PIF_VALUE: 23
PIF_VALUE: 23
PIF_VALUE: 31
PIF_VALUE: 22
PIF_VALUE: 23
PIF_VALUE: 23
PIF_VALUE: 28
PIF_VALUE: 10
PIF_VALUE: 23
PIF_VALUE: 24
PIF_VALUE: 22
PIF_VALUE: 26
PIF_VALUE: 23
PIF_VALUE: 1
PIF_VALUE: 23
PIF_VALUE: 23
PIF_VALUE: 29
PIF_VALUE: 23

## 2018-01-12 ASSESSMENT — PAIN DESCRIPTION - LOCATION: LOCATION: ARM;SHOULDER

## 2018-01-12 ASSESSMENT — PAIN DESCRIPTION - PAIN TYPE: TYPE: ACUTE PAIN

## 2018-01-12 ASSESSMENT — PAIN SCALES - GENERAL
PAINLEVEL_OUTOF10: 0
PAINLEVEL_OUTOF10: 0
PAINLEVEL_OUTOF10: 6
PAINLEVEL_OUTOF10: 0
PAINLEVEL_OUTOF10: 6
PAINLEVEL_OUTOF10: 4
PAINLEVEL_OUTOF10: 0
PAINLEVEL_OUTOF10: 0

## 2018-01-12 ASSESSMENT — PAIN - FUNCTIONAL ASSESSMENT: PAIN_FUNCTIONAL_ASSESSMENT: 0-10

## 2018-01-12 ASSESSMENT — PAIN DESCRIPTION - ORIENTATION: ORIENTATION: RIGHT

## 2018-01-12 ASSESSMENT — PAIN DESCRIPTION - DESCRIPTORS: DESCRIPTORS: ACHING

## 2018-01-12 NOTE — H&P
History and Physical Update    Pt Name: Zackery Barbour  MRN: [id number]  YOB: 1965  Date of evaluation: 1/12/2018    [x] I have examined the patient and reviewed the H&P/Consult and there are no changes to the patient or plans.     [] I have examined the patient and reviewed the H&P/Consult and have noted the following changes:        Winnie Mease [facility]  electronically signed 1/12/2018 at 12:56 PM

## 2018-01-12 NOTE — ANESTHESIA PRE PROCEDURE
Department of Anesthesiology  Preprocedure Note       Name:  Cristobal Ponce   Age:  47 y.o.  :  1965                                          MRN:  [id number]         Date:  2018      Surgeon: Andrzej Alcantar):  Cesar Tavarez DO    Procedure: Procedure(s):  SHOULDER ARTHROSCOPY, ROTATOR CUFF REPAIR, POSSIBLE SUBACHROMIAL DECOMPRESSION, ARTHREX, NSA= INTERSCALENE BLOCK., SPIDER TABLE (SEMI SITTING POSITION)    Medications prior to admission:   Prior to Admission medications    Medication Sig Start Date End Date Taking?  Authorizing Provider   JANUMET XR  MG TB24 per extended release tablet TAKE 1 TABLET BY MOUTH ONE TIME A DAY  17  Yes Avis Hale NP   lisinopril (PRINIVIL;ZESTRIL) 20 MG tablet Take 1 tablet by mouth daily 10/27/17  Yes Avis Hale NP   aspirin 81 MG tablet Take 81 mg by mouth daily    Historical Provider, MD   ibuprofen (ADVIL;MOTRIN) 800 MG tablet Take 800 mg by mouth every 6 hours as needed for Pain    Historical Provider, MD       Current medications:    Current Facility-Administered Medications   Medication Dose Route Frequency Provider Last Rate Last Dose    lactated ringers infusion 1,000 mL  1,000 mL Intravenous Continuous Allison Horn MD 50 mL/hr at 18 1246 1,000 mL at 18 1246    midazolam (VERSED) injection 2 mg  2 mg Intravenous Once Isabel Marcial MD        fentaNYL (SUBLIMAZE) injection 100 mcg  100 mcg Intravenous Once Isabel Marcial MD        ropivacaine (NAROPIN) 0.5% injection 40 mL  40 mL Infiltration Once Isabel Marcial MD           Allergies:  No Known Allergies    Problem List:    Patient Active Problem List   Diagnosis Code    Asthma J45.909    Abscess of axilla L02.419    HTN (hypertension) I10    IGT (impaired glucose tolerance) R73.02    Sciatica M54.30       Past Medical History:        Diagnosis Date    Abscess of axilla     Asthma     as child    Diabetes mellitus ([facility] [address]     dx 6 months ago    HTN (hypertension) 11/14/2013    Sciatica 3/18/2014    Shoulder pain, right     Snores     denies apnea       Past Surgical History:        Procedure Laterality Date    AXILLARY SURGERY Right     abscess (local anesthesia)    ROTATOR CUFF REPAIR      L arm       Social History:    Social History   Substance Use Topics    Smoking status: Never Smoker    Smokeless tobacco: Never Used    Alcohol use Yes      Comment: rarely                                Counseling given: Not Answered      Vital Signs (Current):   Vitals:    01/12/18 1214 01/12/18 1229   BP:  (!) 146/101   Pulse:  84   Resp:  20   Temp:  98.4 °F (36.9 °C)   TempSrc:  Oral   SpO2:  97%   Weight: 285 lb 4.4 oz (129.4 kg)    Height: 6' (1.829 m)                                               BP Readings from Last 3 Encounters:   01/12/18 (!) 146/101   01/09/18 (!) 155/96   01/05/18 (!) 138/92       NPO Status: Time of last liquid consumption: 2300                        Time of last solid consumption: 2300                        Date of last liquid consumption: 01/11/18                        Date of last solid food consumption: 01/11/18    BMI:   Wt Readings from Last 3 Encounters:   01/12/18 285 lb 4.4 oz (129.4 kg)   01/09/18 289 lb 3.9 oz (131.2 kg)   01/05/18 292 lb (132.5 kg)     Body mass index is 38.69 kg/m².     CBC:   Lab Results   Component Value Date    WBC 7.4 01/09/2018    RBC 5.14 01/09/2018    HGB 15.2 01/09/2018    HCT 45.5 01/09/2018    MCV 88.5 01/09/2018    RDW 12.6 01/09/2018     01/09/2018       CMP:   Lab Results   Component Value Date     01/09/2018    K 4.2 01/09/2018    CL 97 01/09/2018    CO2 26 01/09/2018    BUN 15 01/09/2018    CREATININE 0.92 01/09/2018    GFRAA >60 01/09/2018    LABGLOM >60 01/09/2018    GLUCOSE 192 01/09/2018    PROT 7.6 01/09/2018    CALCIUM 9.5 01/09/2018    BILITOT 0.58 01/09/2018    ALKPHOS 55 01/09/2018    AST 32 01/09/2018    ALT 41 01/09/2018       POC Tests:   Recent Labs

## 2018-01-12 NOTE — PROGRESS NOTES
Right interscalene block complete  Patient tolerated procedure  Ropivacaine 0.5 % 30 ml single injection   Versed 2 mg iv/fentanyl 100 mcq iv for the block

## 2018-01-13 NOTE — ANESTHESIA POSTPROCEDURE EVALUATION
Department of Anesthesiology  Postprocedure Note    Patient: Adam Marcus  MRN: [id number]  YOB: 1965  Date of evaluation: 1/12/2018  Time:  8:17 PM     Procedure Summary     Date:  01/12/18 Room / Location:  [facility] OR [address] [place] [address] OR    Anesthesia Start:  6691 Anesthesia Stop:  7645    Procedure:  SHOULDER ARTHROSCOPY, ROTATOR CUFF REPAIR, POSSIBLE SUBACHROMIAL DECOMPRESSION, ARTHREX, NSA= INTERSCALENE BLOCK., SPIDER TABLE (SEMI SITTING POSITION) (Right Shoulder) Diagnosis:  (RIGHT ROTATOR CUFF TEAR )    Surgeon:  Ino Lynch DO Responsible Provider:  Sandra Kendall MD    Anesthesia Type:  general, regional ASA Status:  2          Anesthesia Type: general, regional    Amelia Phase I: Amelia Score: 9    Amelia Phase II: Amelia Score: 10    Last vitals: Reviewed and per EMR flowsheets.        Anesthesia Post Evaluation    Patient location during evaluation: PACU  Patient participation: complete - patient participated  Level of consciousness: awake and alert  Pain score: 0  Airway patency: patent  Nausea & Vomiting: no nausea and no vomiting  Complications: no  Cardiovascular status: blood pressure returned to baseline and hemodynamically stable  Respiratory status: acceptable and nonlabored ventilation  Hydration status: euvolemic

## 2018-01-15 ENCOUNTER — HOSPITAL ENCOUNTER (OUTPATIENT)
Dept: PHYSICAL THERAPY | Age: 53
Setting detail: THERAPIES SERIES
Discharge: HOME | End: 2018-01-15
Payer: COMMERCIAL

## 2018-01-15 PROCEDURE — 97161 PT EVAL LOW COMPLEX 20 MIN: CPT

## 2018-01-15 PROCEDURE — 97110 THERAPEUTIC EXERCISES: CPT

## 2018-01-15 ASSESSMENT — PAIN DESCRIPTION - PROGRESSION: CLINICAL_PROGRESSION: GRADUALLY IMPROVING

## 2018-01-15 ASSESSMENT — PAIN DESCRIPTION - FREQUENCY: FREQUENCY: CONTINUOUS

## 2018-01-15 ASSESSMENT — PAIN DESCRIPTION - PAIN TYPE: TYPE: SURGICAL PAIN

## 2018-01-15 ASSESSMENT — PAIN SCALES - GENERAL: PAINLEVEL_OUTOF10: 7

## 2018-01-15 ASSESSMENT — PAIN DESCRIPTION - LOCATION: LOCATION: SHOULDER

## 2018-01-15 ASSESSMENT — PAIN DESCRIPTION - ONSET: ONSET: UNABLE TO TELL

## 2018-01-15 ASSESSMENT — PAIN DESCRIPTION - ORIENTATION: ORIENTATION: RIGHT;ANTERIOR;POSTERIOR;OUTER

## 2018-01-15 NOTE — OP NOTE
[address] [facility] [address]                                 OPERATIVE REPORT    PATIENT NAME: Augusta Montero                     :        1965  MED REC NO:   [id number]                             ROOM:  ACCOUNT NO:   [de-identified]                           ADMIT DATE: 2018  PROVIDER:     Francheska Whitfield    DATE OF PROCEDURE:  2018    PREOPERATIVE DIAGNOSIS:  Right shoulder rotator cuff tear. POSTOPERATIVE DIAGNOSIS:  Right shoulder rotator cuff tear. PROCEDURES:  Right shoulder arthroscopy, arthroscopically assisted rotator  cuff repair. SURGEON:  Francheska Whitfield DO    ASSISTANT:  Wilton Gorman DO    ANESTHESIA:  General with regional block. COMPLICATIONS:  None. DISPOSITION:  To PACU in stable condition. NARRATIVE SUMMARY:  The patient is a 58-year-old male who presented to  [facility]'s Outpatient Surgical Department for right  shoulder arthroscopy and rotator cuff repair. This has been progressively  worse. The right shoulder pain has failed to improve despite conservative  therapy to include activity modification. Symptoms are greatly affecting  his usual activities of daily living, and as a result, he has indicated he  will have surgical intervention at this time. The patient was identified preoperatively, where consent was obtained,  signed, and placed in the chart. The procedure was described. His  questions were answered. The risks of the procedure were described  including, but not limited to, risk of anesthesia; infection; bleeding;  need for further surgery in the future, numbness, tingling, weakness, or  pain to the right upper extremity; scar; stiffness; failure of the  procedure to improve his symptoms; recurrent symptoms; and re-tear of his  rotator cuff. He notes understanding of these risks and states he wishes  to proceed.     He was administered IV antibiotics perioperatively. He was then taken to  the operative suite, where he was placed supine upon the operative table. General anesthesia was affected after a regional block was affected to the  right upper extremity by the Anesthesia team.  The patient placed in the  modified beach-chair position. All bony prominences were protected and  well padded. The head and neck were held in neutral flexion and extension  and side bending and rotation. The right upper extremity was prepped and  draped under sterile fashion. After appropriate surgical time-out, the  right upper extremity was evaluated under general anesthesia. It was found  to be significantly stable with full range of motion. A standard posterior  portal was created just inferior and medial to the posterolateral corner of  the acromion. After sharp incision was carried through the skin, an  obturator and cannula were advanced to intra-articular portion of the shoulder aiming for the  ipsilateral coracoid. Sharp incision was carried through skin, and the  portal was dilated with the obturator and cannula, and serial arthroscopy  was carried out under sterile saline pump pressure of 50-80 mmHg. The  glenohumeral joint was inspected, and the popliteal hiatus was found to be  pristine, and the anterior portal was created in the interval between the  Middle glenohumeral ligament, the biceps tendon, humeral head, and the glenoid. Sharp incision was carried through the skin after spinal needle  localization, and placement of a threaded cannula was made within that portal.  The labrum  was probed circumferentially. It was found to be intact. Mild fraying of  the biceps, approximately 1 cm from the superior glenoid tubercle  insertion, was appreciated, and no full-thickness biceps tear with significantly less than a 50% tear of the  biceps was appreciated. There was no significant inflammation of the  biceps.   The subscapularis was found to be intact, well attached without  fraying or significant inflammatory changes. The axillary pouch showed no  loose bodies or significant pathology. The capsule was noted to be intact  without significant inflammation, and then a full-thickness tear of the  supraspinatus and infraspinatus with a C-shaped, crescent-shaped tear with  retraction was appreciated. Attention was then drawn to the subacromial space, where the anterior and  posterior portals were repositioned. A complete subacromial bursectomy was  affected with the shaver and a thermal ablator, and the underlying rotator  cuff was inspected and found to be torn as visualized from the articular  surface. A footprint was shaved down to bleeding tissue using a lateral  portal and anterior portal and an accessory anterior portal.  Sutures were  placed in horizontal mattress fashion along the periphery of the tear. The tear could easily  be mobilized. It was sutured back to the anatomic footprint, and 2 Arthrex  SwiveLock anchors were utilized for re-attachment of the rotator cuff back  to its anatomic footprint with a single-row repair. These were noted to be  Anchored well, and they held the rotator cuff well and in good position. There  was no significant impingement of the acromion on the rotator cuff, and an  acromioplasty was not felt to be necessary. The acromioclavicular joint  was visualized, and no significant subclavicular spurring was appreciated  that would impinge. His shoulder was then thoroughly irrigated and suctioned. All arthroscopic  equipment was removed from the shoulder under direct visualization. The  portal was closed using nylon suture. Sterile dressing was applied. Anesthesia was reversed. The patient was taken to the postoperative  recovery room in stable condition. There were no immediate postoperative  complications. The procedure was tolerated well.         Linda Conteh    D: 01/13/2018 12:00:00       T:

## 2018-01-18 ENCOUNTER — HOSPITAL ENCOUNTER (OUTPATIENT)
Dept: PHYSICAL THERAPY | Age: 53
Setting detail: THERAPIES SERIES
Discharge: HOME | End: 2018-01-18
Payer: COMMERCIAL

## 2018-01-18 PROCEDURE — G0283 ELEC STIM OTHER THAN WOUND: HCPCS

## 2018-01-18 PROCEDURE — 97110 THERAPEUTIC EXERCISES: CPT

## 2018-01-18 ASSESSMENT — PAIN SCALES - GENERAL: PAINLEVEL_OUTOF10: 7

## 2018-01-18 ASSESSMENT — PAIN DESCRIPTION - DESCRIPTORS: DESCRIPTORS: CONSTANT

## 2018-01-18 ASSESSMENT — PAIN DESCRIPTION - LOCATION: LOCATION: SHOULDER

## 2018-01-18 ASSESSMENT — PAIN DESCRIPTION - FREQUENCY: FREQUENCY: CONTINUOUS

## 2018-01-18 ASSESSMENT — PAIN DESCRIPTION - ORIENTATION: ORIENTATION: RIGHT;ANTERIOR;POSTERIOR;OUTER

## 2018-01-18 ASSESSMENT — PAIN DESCRIPTION - PAIN TYPE: TYPE: SURGICAL PAIN

## 2018-01-19 ENCOUNTER — HOSPITAL ENCOUNTER (OUTPATIENT)
Dept: PHYSICAL THERAPY | Age: 53
Setting detail: THERAPIES SERIES
Discharge: HOME | End: 2018-01-19
Payer: COMMERCIAL

## 2018-01-19 PROCEDURE — G0283 ELEC STIM OTHER THAN WOUND: HCPCS

## 2018-01-19 PROCEDURE — 97110 THERAPEUTIC EXERCISES: CPT

## 2018-01-19 ASSESSMENT — PAIN DESCRIPTION - LOCATION: LOCATION: SHOULDER

## 2018-01-19 ASSESSMENT — PAIN DESCRIPTION - FREQUENCY: FREQUENCY: CONTINUOUS

## 2018-01-19 ASSESSMENT — PAIN SCALES - GENERAL: PAINLEVEL_OUTOF10: 5

## 2018-01-19 ASSESSMENT — PAIN DESCRIPTION - DESCRIPTORS: DESCRIPTORS: CONSTANT

## 2018-01-19 ASSESSMENT — PAIN DESCRIPTION - PAIN TYPE: TYPE: SURGICAL PAIN

## 2018-01-19 ASSESSMENT — PAIN DESCRIPTION - ORIENTATION: ORIENTATION: RIGHT;ANTERIOR;POSTERIOR;OUTER

## 2018-01-19 NOTE — PROGRESS NOTES
Co-treatment   Time In 1430         Time Out 1530         Minutes 60           Treatment Charges: Minutes Units   []  Ultrasound     [x]  Electrical-Stim 15 1   []  Iontophoresis     []  Traction     []  Massage       []  Eval     []  Gait     [x]  Ther Exercise 45  3    []  Manual Therapy       []  Ther Activities       []  Aquatics     []  Neuro Re-Ed       []  Other       Total Treatment Time: 61 4      Maryan Tiffanie, PT DPT

## 2018-01-22 ENCOUNTER — HOSPITAL ENCOUNTER (OUTPATIENT)
Dept: PHYSICAL THERAPY | Age: 53
Setting detail: THERAPIES SERIES
Discharge: HOME | End: 2018-01-22
Payer: COMMERCIAL

## 2018-01-22 DIAGNOSIS — M75.121 COMPLETE TEAR OF RIGHT ROTATOR CUFF: Primary | ICD-10-CM

## 2018-01-22 PROCEDURE — G0283 ELEC STIM OTHER THAN WOUND: HCPCS

## 2018-01-22 PROCEDURE — 97110 THERAPEUTIC EXERCISES: CPT

## 2018-01-22 ASSESSMENT — PAIN DESCRIPTION - LOCATION: LOCATION: SHOULDER

## 2018-01-22 ASSESSMENT — PAIN DESCRIPTION - PAIN TYPE: TYPE: SURGICAL PAIN

## 2018-01-22 ASSESSMENT — PAIN DESCRIPTION - ORIENTATION: ORIENTATION: RIGHT;ANTERIOR;POSTERIOR;OUTER

## 2018-01-22 ASSESSMENT — PAIN DESCRIPTION - FREQUENCY: FREQUENCY: CONTINUOUS

## 2018-01-22 ASSESSMENT — PAIN DESCRIPTION - DESCRIPTORS: DESCRIPTORS: CONSTANT

## 2018-01-22 ASSESSMENT — PAIN SCALES - GENERAL: PAINLEVEL_OUTOF10: 4

## 2018-01-24 ENCOUNTER — OFFICE VISIT (OUTPATIENT)
Dept: ORTHOPEDIC SURGERY | Age: 53
End: 2018-01-24

## 2018-01-24 ENCOUNTER — HOSPITAL ENCOUNTER (OUTPATIENT)
Dept: PHYSICAL THERAPY | Age: 53
Setting detail: THERAPIES SERIES
Discharge: HOME | End: 2018-01-24
Payer: COMMERCIAL

## 2018-01-24 VITALS — BODY MASS INDEX: 38.64 KG/M2 | WEIGHT: 285.27 LBS | HEIGHT: 72 IN

## 2018-01-24 DIAGNOSIS — M75.121 COMPLETE TEAR OF RIGHT ROTATOR CUFF: Primary | ICD-10-CM

## 2018-01-24 PROCEDURE — 97140 MANUAL THERAPY 1/> REGIONS: CPT

## 2018-01-24 PROCEDURE — G0283 ELEC STIM OTHER THAN WOUND: HCPCS

## 2018-01-24 PROCEDURE — 97110 THERAPEUTIC EXERCISES: CPT

## 2018-01-24 PROCEDURE — 99024 POSTOP FOLLOW-UP VISIT: CPT | Performed by: ORTHOPAEDIC SURGERY

## 2018-01-24 ASSESSMENT — ENCOUNTER SYMPTOMS
APNEA: 0
CHEST TIGHTNESS: 0
ABDOMINAL PAIN: 0
VOMITING: 0
COUGH: 0

## 2018-01-24 NOTE — LETTER
[address] [place] [address] [place] [address]  Phone: [phone]  Fax: Leon Raphael,         January 24, 2018     Patient: Melisa Warner   YOB: 1965   Date of Visit: 1/24/2018       To Whom it May Concern:    Jase Anglin was seen in my clinic on 1/24/2018. He may return to work on 1/29/18with left hand work only. Patient is to wear sling to right arm while at work. If you have any questions or concerns, please don't hesitate to call.     Sincerely,           Real Lopez, DO

## 2018-01-24 NOTE — PROGRESS NOTES
[address] [place] [address]  Dept: [phone]  Dept Fax: [phone]        Postoperative follow-up note    Subjective:   Tommy Gilbert is a 47y.o. year old male who presents to our office today for postoperative followup regarding his   1. Complete tear of right rotator cuff    . Chief Complaint   Patient presents with    Post-Op Check     right RTR DOS: 01/12/18     Tommy Gilbert  is a 47y.o. year old male who presents to our office today for postoperative follow up after having undergone a Right shoulder arthroscopy, arthroscopically assisted rotator cuff repair on 1/12/18. The patient denies fevers, chills, nausea, vomiting, diarrhea. The patient sustained an injury at work on 9/6/2017. He was picking up a 20 pound piece of beef and heard a pop to his shoulder. The patient has started physical therapy since having surgery and states that it does help. Patient still wearing sling at today's visit. No longer taking narcotics for pain. 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 Patient states that therapy is done on February 5th and will need to complete twelve weeks since day of surgery. Review of Systems   Constitutional: Negative for chills and fever. Respiratory: Negative for apnea, cough and chest tightness. Cardiovascular: Negative for chest pain and palpitations. Gastrointestinal: Negative for abdominal pain and vomiting. Genitourinary: Negative for difficulty urinating. Musculoskeletal: Positive for arthralgias (Right shoulder). Negative for gait problem, joint swelling and myalgias. Neurological: Negative for dizziness, weakness and numbness. I have reviewed the CC, HPI, ROS, PMH, FHX, Social History. I agree with the documentation provided by other staff, residents,and have reviewed their documentation prior to providing my signature indicating agreement.     Objective :   General: Meagan Andrews is a 47 y.o. male who is alert and oriented and sitting comfortably in our office. Ortho Exam  MS:  Erythema around portal sites. No signs of infection right shoulder appreciated. Motor, sensory, vascular examination right upper extremity is grossly intact without focal deficits. Neuro: alert. oriented  Eyes: Extra-ocular muscles intact  Mouth: Oral mucosa moist. No perioral lesions  Pulm: Respirations unlabored and regular. Skin: warm, well perfused  Psych:   Patient has good fund of knowledge and displays understanging of exam, diagnosis, and plan. Radiology:     History: Status post right shoulder arthroscopically assisted rotator cuff repair    Findings: AP, scapular Y, axillary view x-rays of right shoulder done in the office today shows slight irregularity at the greater tuberosity secondary to an ankle replacement at the time of surgery. Otherwise no evidence of fracture, subluxation, dislocation, radiopaque foreign body, radiopaque tumors noted. Impression: Status post right shoulder arthroscopically assisted rotator cuff repair with greater tuberosity changes as described above. Assessment:      1. Complete tear of right rotator cuff         Plan:      Patient can stop using sling at home but is to wear sling during work or when going out. Sutures removed today from right shoulder. Skin cleansed with alcohol and sutures removed. Patient tolerates well. Advised patient to always be aware that rotator cuff can be easily re-injured. Patient voices understanding. Patient can return to work Monday 1/29/17 with left handed work only and to wear sling while working. Will issue a C-9 for 12 weeks of therapy. Patient can call office with any questions or concerns. Follow up:Return in about 4 weeks (around 2/21/2018). No orders of the defined types were placed in this encounter. No orders of the defined types were placed in this encounter.        Lulu Aleman LPN am

## 2018-01-25 ENCOUNTER — HOSPITAL ENCOUNTER (OUTPATIENT)
Dept: PHYSICAL THERAPY | Age: 53
Setting detail: THERAPIES SERIES
Discharge: HOME | End: 2018-01-25
Payer: COMMERCIAL

## 2018-01-25 PROCEDURE — 97110 THERAPEUTIC EXERCISES: CPT

## 2018-01-26 NOTE — PROGRESS NOTES
40           Treatment Charges: Minutes Units   []  Ultrasound     []  Electrical-Stim     []  Iontophoresis     []  Traction     []  Massage       []  Eval     []  Gait     [x]  Ther Exercise 40 3    []  Manual Therapy       []  Ther Activities       []  Aquatics     []  Neuro Re-Ed       []  Other       Total Treatment Time: 36 3      UJLY MoellerT

## 2018-01-29 ENCOUNTER — HOSPITAL ENCOUNTER (OUTPATIENT)
Dept: PHYSICAL THERAPY | Age: 53
Setting detail: THERAPIES SERIES
Discharge: HOME | End: 2018-01-29
Payer: COMMERCIAL

## 2018-01-29 ENCOUNTER — APPOINTMENT (OUTPATIENT)
Dept: PHYSICAL THERAPY | Age: 53
End: 2018-01-29
Payer: COMMERCIAL

## 2018-01-29 PROCEDURE — G0283 ELEC STIM OTHER THAN WOUND: HCPCS

## 2018-01-29 PROCEDURE — 97110 THERAPEUTIC EXERCISES: CPT

## 2018-01-29 PROCEDURE — 97140 MANUAL THERAPY 1/> REGIONS: CPT

## 2018-01-31 ENCOUNTER — HOSPITAL ENCOUNTER (OUTPATIENT)
Dept: PHYSICAL THERAPY | Age: 53
Setting detail: THERAPIES SERIES
Discharge: HOME | End: 2018-01-31
Payer: COMMERCIAL

## 2018-01-31 PROCEDURE — 97140 MANUAL THERAPY 1/> REGIONS: CPT

## 2018-01-31 PROCEDURE — 97032 APPL MODALITY 1+ESTIM EA 15: CPT

## 2018-01-31 PROCEDURE — 97110 THERAPEUTIC EXERCISES: CPT

## 2018-01-31 ASSESSMENT — PAIN DESCRIPTION - LOCATION: LOCATION: SHOULDER

## 2018-01-31 ASSESSMENT — PAIN SCALES - GENERAL: PAINLEVEL_OUTOF10: 2

## 2018-01-31 ASSESSMENT — PAIN DESCRIPTION - ORIENTATION: ORIENTATION: RIGHT;ANTERIOR;POSTERIOR;OUTER

## 2018-01-31 ASSESSMENT — PAIN DESCRIPTION - DESCRIPTORS: DESCRIPTORS: CONSTANT;ACHING;SORE

## 2018-01-31 ASSESSMENT — PAIN DESCRIPTION - PAIN TYPE: TYPE: SURGICAL PAIN

## 2018-02-02 ENCOUNTER — HOSPITAL ENCOUNTER (OUTPATIENT)
Dept: PHYSICAL THERAPY | Age: 53
Setting detail: THERAPIES SERIES
Discharge: HOME | End: 2018-02-02
Payer: COMMERCIAL

## 2018-02-02 PROCEDURE — G0283 ELEC STIM OTHER THAN WOUND: HCPCS

## 2018-02-02 PROCEDURE — 97110 THERAPEUTIC EXERCISES: CPT

## 2018-02-05 ENCOUNTER — HOSPITAL ENCOUNTER (OUTPATIENT)
Dept: PHYSICAL THERAPY | Age: 53
Setting detail: THERAPIES SERIES
Discharge: HOME | End: 2018-02-05
Payer: COMMERCIAL

## 2018-02-05 PROCEDURE — G0283 ELEC STIM OTHER THAN WOUND: HCPCS

## 2018-02-05 PROCEDURE — 97110 THERAPEUTIC EXERCISES: CPT

## 2018-02-05 PROCEDURE — 97140 MANUAL THERAPY 1/> REGIONS: CPT

## 2018-02-05 ASSESSMENT — PAIN SCALES - GENERAL: PAINLEVEL_OUTOF10: 2

## 2018-02-05 ASSESSMENT — PAIN DESCRIPTION - ORIENTATION: ORIENTATION: RIGHT;ANTERIOR

## 2018-02-05 ASSESSMENT — PAIN DESCRIPTION - DESCRIPTORS: DESCRIPTORS: CONSTANT;ACHING;SORE

## 2018-02-05 ASSESSMENT — PAIN DESCRIPTION - LOCATION: LOCATION: SHOULDER

## 2018-02-05 ASSESSMENT — PAIN DESCRIPTION - PAIN TYPE: TYPE: SURGICAL PAIN

## 2018-02-07 ENCOUNTER — HOSPITAL ENCOUNTER (OUTPATIENT)
Dept: PHYSICAL THERAPY | Age: 53
Setting detail: THERAPIES SERIES
Discharge: HOME | End: 2018-02-07
Payer: COMMERCIAL

## 2018-02-07 PROCEDURE — 97110 THERAPEUTIC EXERCISES: CPT

## 2018-02-07 PROCEDURE — G0283 ELEC STIM OTHER THAN WOUND: HCPCS

## 2018-02-07 PROCEDURE — 97140 MANUAL THERAPY 1/> REGIONS: CPT

## 2018-02-07 ASSESSMENT — PAIN DESCRIPTION - PAIN TYPE: TYPE: SURGICAL PAIN

## 2018-02-07 ASSESSMENT — PAIN SCALES - GENERAL: PAINLEVEL_OUTOF10: 3

## 2018-02-07 NOTE — PROGRESS NOTES
limitations: Decreased ROM  Treatment Diagnosis: (R) shoulder pain   Prognosis: Excellent  Activity Tolerance: Patient Tolerated treatment well    Plan:  Plan: Continue with current plan    Therapy Time:  Time In: 1300  Time Out: 1345  Minutes: 45  Timed Code Treatment Minutes: 30 Minutes    Treatment Charges: Minutes Units   []  Ultrasound     [x]  Electrical-Stim 15 1   []  Iontophoresis     []  Traction     []  Massage       []  Eval     []  Gait     [x]  Ther Exercise  15  1   [x]  Manual Therapy  15  1   []  Ther Activities       []  Aquatics     []  Neuro Re-Ed       [x]  Other CP w/ Estim 15  0    Total Treatment Time: 45 3        Alize Sykes, PTA

## 2018-02-09 ENCOUNTER — HOSPITAL ENCOUNTER (OUTPATIENT)
Dept: PHYSICAL THERAPY | Age: 53
Setting detail: THERAPIES SERIES
Discharge: HOME | End: 2018-02-09
Payer: COMMERCIAL

## 2018-02-09 PROCEDURE — 97110 THERAPEUTIC EXERCISES: CPT

## 2018-02-09 PROCEDURE — G0283 ELEC STIM OTHER THAN WOUND: HCPCS

## 2018-02-09 ASSESSMENT — PAIN DESCRIPTION - PAIN TYPE: TYPE: SURGICAL PAIN

## 2018-02-09 ASSESSMENT — PAIN DESCRIPTION - ORIENTATION: ORIENTATION: RIGHT;ANTERIOR

## 2018-02-09 ASSESSMENT — PAIN SCALES - GENERAL: PAINLEVEL_OUTOF10: 2

## 2018-02-09 ASSESSMENT — PAIN DESCRIPTION - LOCATION: LOCATION: SHOULDER

## 2018-02-09 ASSESSMENT — PAIN DESCRIPTION - DESCRIPTORS: DESCRIPTORS: CONSTANT;ACHING;SORE;TENDER

## 2018-02-09 ASSESSMENT — PAIN DESCRIPTION - FREQUENCY: FREQUENCY: CONTINUOUS

## 2018-02-09 NOTE — PROGRESS NOTES
sling/unable to actively use it per the physician's protocol. 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     Objective  Observation/Palpation  Palpation: Slight Warmth to touch but not tender to the touch within the (R) shoulder (Anterior region only)   Observation: Forward shoulder, rounded shoulder(s)   Range of Motion  PROM RUE (degrees)  RUE PROM: Exceptions  R Shoulder Flex  0-180: 125 degrees  R Shoulder Ext  0-45: N/A  R Shoulder ABduction 0-180: N/A  R Shoulder Int Rotation  0-70: 50 degrees @ 90 degrees  R Shoulder Ext Rotation  0-90: 15 degrees @ 90 degrees  R Shoulder Horiz ABduction 0-40: N/A  R Shoulder Horiz ADduction 0-120: N/A  R Elbow Flex/Ext 0-145: WNL  R Forearm Pron 0-90: WNL  R Forearm Sup 0-90: WNL  R Wrist Flex 0-80: WNL  R Wrist Ext 0-70: WNL  R Wrist Radial Deviation 0-20:  WNL  R Wrist Ulnar Deviation 0-45: WNL  Strength  Strength LLE  Strength LLE: WFL  Strength RUE  Comment: N/A per protocol   Sensation  Overall Sensation Status: WNL (With Light Touch )  Exercises  Exercise 1: Standing isometric flexion  3 sec hold x 10 reps   Exercise 2: Standing isometric abduction 3 sec hold x 10 reps   Exercise 3: Standing isometric ER 3 sec hold x 10 reps   Exercise 4: Standing isometric IR 3 sec hold x 10 reps   Exercise 5: Seated isometric elbow flexion 3 sec hold x 10 reps   Exercise 6: Green Hand Gripper (Hard) 20 reps (R) UE   Exercise 7: Seated AROM (R) elbow flexion 20 address his functional needs. [] Patient is Discharged At This Time Unless Further Orders Are Received. New Script Needed To Continue Treatment: [x] No   [] Yes  (visits left on current prescription:     12          ) Please sign orders at the bottom of this page and return by faxing. Thank you. Current Treatment:  [x] Therapeutic Exercise    [x] Modalities                [] Work Conditioning  [] Therapeutic Activity    [] Ultrasound  [x] Electrical Stimulation  [] Gait Training     [] Massage       [] Lumbar/Cervical Traction  [] Neuromuscular Re-education [x] Cold/hot pack [] Iontophoresis: 4 mg/mL  [x] Instruction in Home Exercise Program                     Dexamethasone Sodium  [] Manual Therapy             Phosphate 40-80 mA min  [] Aquatic Therapy                         [] Other:  More objective information is available upon request.                      Physical Therapy Prescription:      [] Continue current Rx    [] Therapist Discretion    [] Rx as below  Recommended Changes to Treatment:  [] Heat/Cold  [] Stretching  [] Therapeutic Exercise  [] Reconditioning  [] Massage  [] Ultrasound  [] Electrical Stim  [] Iontophoresis: 40 mg/ml Dexamethasone Sodium Phosphate 40-80 mA min  [] Aquatics  [] Other:  Frequency:          X/wk for          wks    Medicare/Regulatory Requirements:  I have reviewed this plan of care and certify a need for medically necessary rehabilitation services.   [] Physician Signature                                                   Date:       Thank you for your referral                    Electronically signed by: Aydee Luna PT  DPT    [facility]) @ [address].  Phone [phone]  Fax [phone]    Therapy Time   Individual Concurrent Group Co-treatment   Time In  [id number]         Time Out 1415         Minutes 80           Treatment Charges: Minutes Units   []  Ultrasound     [x]

## 2018-02-12 ENCOUNTER — HOSPITAL ENCOUNTER (OUTPATIENT)
Dept: PHYSICAL THERAPY | Age: 53
Setting detail: THERAPIES SERIES
Discharge: HOME | End: 2018-02-12
Payer: COMMERCIAL

## 2018-02-12 PROCEDURE — G0283 ELEC STIM OTHER THAN WOUND: HCPCS

## 2018-02-12 PROCEDURE — 97140 MANUAL THERAPY 1/> REGIONS: CPT

## 2018-02-12 PROCEDURE — 97110 THERAPEUTIC EXERCISES: CPT

## 2018-02-12 ASSESSMENT — PAIN DESCRIPTION - FREQUENCY: FREQUENCY: CONTINUOUS

## 2018-02-12 ASSESSMENT — PAIN DESCRIPTION - PAIN TYPE: TYPE: SURGICAL PAIN

## 2018-02-12 ASSESSMENT — PAIN SCALES - GENERAL: PAINLEVEL_OUTOF10: 3

## 2018-02-12 ASSESSMENT — PAIN DESCRIPTION - LOCATION: LOCATION: SHOULDER

## 2018-02-12 ASSESSMENT — PAIN DESCRIPTION - DESCRIPTORS: DESCRIPTORS: CONSTANT;ACHING

## 2018-02-12 ASSESSMENT — PAIN DESCRIPTION - ORIENTATION: ORIENTATION: RIGHT;ANTERIOR

## 2018-02-12 NOTE — PROGRESS NOTES
stabilization ER/IR in scapular plane 20 reps     Cryotherapy (Minutes\Location): 15 minutes  E-stim (parameters): E-stim IFC x 15 minutes to the (R) shoulder (semi-reclined position)     Comment  NEW C-9 TILL 3/5    Assessment  Body structures, Functions, Activity limitations: Decreased ROM  Treatment Diagnosis: (R) shoulder pain   Prognosis: Excellent    Activity Tolerance  Patient Tolerated treatment well    Plan  Continue with current plan    Therapy Time:  Time In: 1340  Time Out: 1420  Minutes: 40  Timed Code Treatment Minutes: 25 Minutes    Treatment Charges: Minutes Units   []  Ultrasound     [x]  Electrical-Stim 15 1   []  Iontophoresis     []  Traction     []  Massage       []  Eval     []  Gait     [x]  Ther Exercise 15   1   [x]  Manual Therapy 10  1   []  Ther Activities       []  Aquatics     []  Neuro Re-Ed       [x]  Other/CP  15 0    Total Treatment Time:  3     Sukumar Sykes, PTA

## 2018-02-14 ENCOUNTER — HOSPITAL ENCOUNTER (OUTPATIENT)
Dept: PHYSICAL THERAPY | Age: 53
Setting detail: THERAPIES SERIES
Discharge: HOME | End: 2018-02-14
Payer: COMMERCIAL

## 2018-02-14 PROCEDURE — 97140 MANUAL THERAPY 1/> REGIONS: CPT

## 2018-02-14 PROCEDURE — 97110 THERAPEUTIC EXERCISES: CPT

## 2018-02-14 PROCEDURE — G0283 ELEC STIM OTHER THAN WOUND: HCPCS

## 2018-02-14 ASSESSMENT — PAIN DESCRIPTION - LOCATION: LOCATION: SHOULDER

## 2018-02-14 ASSESSMENT — PAIN DESCRIPTION - ORIENTATION: ORIENTATION: RIGHT;ANTERIOR

## 2018-02-14 ASSESSMENT — PAIN DESCRIPTION - FREQUENCY: FREQUENCY: CONTINUOUS

## 2018-02-14 ASSESSMENT — PAIN DESCRIPTION - PAIN TYPE: TYPE: SURGICAL PAIN

## 2018-02-14 ASSESSMENT — PAIN SCALES - GENERAL: PAINLEVEL_OUTOF10: 2

## 2018-02-14 ASSESSMENT — PAIN DESCRIPTION - DESCRIPTORS: DESCRIPTORS: CONSTANT;ACHING

## 2018-02-14 NOTE — PROGRESS NOTES
(Minutes\Location): 15 minutes  E-stim (parameters): E-stim IFC x 15 minutes to the (R) shoulder (semi-reclined position)     Assessment:   Body structures, Functions, Activity limitations: Decreased ROM  Treatment Diagnosis: (R) shoulder pain   Prognosis: Excellent  Activity Tolerance: Patient Tolerated treatment well    Plan:  Plan: Continue with current plan    Therapy Time:  Time In: 1545  Time Out: 1635  Minutes: 50  Timed Code Treatment Minutes: 35 Minutes    Treatment Charges: Minutes Units   []  Ultrasound     [x]  Electrical-Stim 15 1   []  Iontophoresis     []  Traction     []  Massage       []  Eval     []  Gait     [x]  Ther Exercise 20  1    [x]  Manual Therapy  15 1    []  Ther Activities       []  Aquatics     []  Neuro Re-Ed       [x]  Other CP 15'  0    Total Treatment Time: 50 3       Kenji Sykes, PTA

## 2018-02-15 ENCOUNTER — HOSPITAL ENCOUNTER (OUTPATIENT)
Dept: PHYSICAL THERAPY | Age: 53
Setting detail: THERAPIES SERIES
Discharge: HOME | End: 2018-02-15
Payer: COMMERCIAL

## 2018-02-15 PROCEDURE — 97140 MANUAL THERAPY 1/> REGIONS: CPT

## 2018-02-15 PROCEDURE — G0283 ELEC STIM OTHER THAN WOUND: HCPCS

## 2018-02-15 PROCEDURE — 97110 THERAPEUTIC EXERCISES: CPT

## 2018-02-15 ASSESSMENT — PAIN DESCRIPTION - LOCATION: LOCATION: SHOULDER

## 2018-02-15 ASSESSMENT — PAIN DESCRIPTION - FREQUENCY: FREQUENCY: CONTINUOUS

## 2018-02-15 ASSESSMENT — PAIN DESCRIPTION - DESCRIPTORS: DESCRIPTORS: ACHING;CONSTANT

## 2018-02-15 ASSESSMENT — PAIN SCALES - GENERAL: PAINLEVEL_OUTOF10: 1

## 2018-02-15 ASSESSMENT — PAIN DESCRIPTION - ORIENTATION: ORIENTATION: RIGHT;ANTERIOR

## 2018-02-15 ASSESSMENT — PAIN DESCRIPTION - PAIN TYPE: TYPE: SURGICAL PAIN

## 2018-02-19 ENCOUNTER — HOSPITAL ENCOUNTER (OUTPATIENT)
Dept: PHYSICAL THERAPY | Age: 53
Setting detail: THERAPIES SERIES
Discharge: HOME | End: 2018-02-19
Payer: COMMERCIAL

## 2018-02-19 PROCEDURE — 97110 THERAPEUTIC EXERCISES: CPT

## 2018-02-19 PROCEDURE — G0283 ELEC STIM OTHER THAN WOUND: HCPCS

## 2018-02-19 NOTE — PROGRESS NOTES
Physical Therapy  [address]   Outpatient Physical Therapy  [address] [facility]. [address]  Phone: [phone]  Fax: [phone]  Daily Progress Note    Date: 18    Patient Name: Thor Adkins        MRN: [id number]  Account: [de-identified] : 1965      General Information:  Referring Practitioner: Mayra Gordillo DO   Referral Date : 18  Diagnosis: Complete tear of right rotator cuff (M75.121) Impingement syndrome of right shoulder (M75.41) Adhesive capsulitis of right shoulder (M75.01)   Follows Commands: Within Functional Limits  Other (Comment): Surgery date 2018 (5 weeks post -op)   Onset Date: 18  PT Insurance Information: Worker's Comp  Total # of Visits Approved: 24  Total # of Visits to Date: 12  Plan of Care/Certification Expiration Date: 2018    Subjective  Reports going to see doctor on Wednesday this week. Pain  Patient Currently in Pain: Denies    Objective  Exercise 8: Supine PROM (R) shoulder elevation, ER and IR 30 sec hold x 3 reps with each one. Exercise 9: Supine Dynamic stabilization flexion/extension @ 100 degrees (shoulder) 20 reps   Exercise 11: Standing P/AAROM cane flexion,  in scapular plane, 5x10\" hold  Exercise 12: Standing P/AAROM abduction 5x10\" hold  Exercise 13: Pulleys Flex/ABD 10\" hold 10 reps  Exercise 14: Standing Isotonic ER R shoulder R1 Tband 10x10\" hold  Exercise 15: Prone R UE Rows x10 reps (Pain free ranges)  Exercise 16: Prone horizontal ABD (R) UE x10 reps  Exercise 17: seated Biceps curls R UE #1 3x10 reps    Moist heat: before stretching or pulleys 10' Right shoulder  Cryotherapy (Minutes\Location): 15 minutes  E-stim (parameters): E-stim IFC x 15 minutes to the (R) shoulder (semi-reclined position)     Assessment  Body structures, Functions, Activity limitations: Decreased ROM  Treatment Diagnosis: (R) shoulder pain   Prognosis: Excellent  Comment: Able to progress per physician's protocol (5 weeks post op).

## 2018-02-21 ENCOUNTER — OFFICE VISIT (OUTPATIENT)
Dept: ORTHOPEDIC SURGERY | Age: 53
End: 2018-02-21

## 2018-02-21 ENCOUNTER — HOSPITAL ENCOUNTER (OUTPATIENT)
Dept: PHYSICAL THERAPY | Age: 53
Setting detail: THERAPIES SERIES
Discharge: HOME | End: 2018-02-21
Payer: COMMERCIAL

## 2018-02-21 VITALS
DIASTOLIC BLOOD PRESSURE: 89 MMHG | SYSTOLIC BLOOD PRESSURE: 132 MMHG | HEIGHT: 72 IN | BODY MASS INDEX: 39.14 KG/M2 | WEIGHT: 289 LBS

## 2018-02-21 DIAGNOSIS — M75.121 COMPLETE TEAR OF RIGHT ROTATOR CUFF: Primary | ICD-10-CM

## 2018-02-21 PROCEDURE — 97110 THERAPEUTIC EXERCISES: CPT

## 2018-02-21 PROCEDURE — 97140 MANUAL THERAPY 1/> REGIONS: CPT

## 2018-02-21 PROCEDURE — 99024 POSTOP FOLLOW-UP VISIT: CPT | Performed by: ORTHOPAEDIC SURGERY

## 2018-02-21 PROCEDURE — G0283 ELEC STIM OTHER THAN WOUND: HCPCS

## 2018-02-21 ASSESSMENT — PAIN DESCRIPTION - ORIENTATION: ORIENTATION: RIGHT;ANTERIOR

## 2018-02-21 ASSESSMENT — PAIN DESCRIPTION - FREQUENCY: FREQUENCY: CONTINUOUS

## 2018-02-21 ASSESSMENT — ENCOUNTER SYMPTOMS
NAUSEA: 0
CONSTIPATION: 0
COUGH: 0
DIARRHEA: 0

## 2018-02-21 ASSESSMENT — PAIN DESCRIPTION - DESCRIPTORS: DESCRIPTORS: ACHING;CONSTANT

## 2018-02-21 ASSESSMENT — PAIN SCALES - GENERAL: PAINLEVEL_OUTOF10: 1

## 2018-02-21 ASSESSMENT — PAIN DESCRIPTION - LOCATION: LOCATION: SHOULDER

## 2018-02-21 ASSESSMENT — PAIN DESCRIPTION - PAIN TYPE: TYPE: SURGICAL PAIN

## 2018-02-21 NOTE — PROGRESS NOTES
displays understanging of exam, diagnosis, and plan. Assessment:      1. Complete tear of right rotator cuff         Plan:      Patient is doing very well. He is happy with the pain relief he has received so far feels like he continues to improve with physical therapy. He should continue with physical therapy. We will submit a C9 for more physical therapy. Continue with current work restrictions. Follow up in 6 weeks. Kristine Downs MA am scribing for and in the presence of Dr. Ervin Cherry  2/21/2018 4:40 PM               Follow up:Return in about 6 weeks (around 4/4/2018) for re-evaluation. No orders of the defined types were placed in this encounter. No orders of the defined types were placed in this encounter. I have reviewed and made changes accordingly to the work scribed by Mo Juares MA. The documentation accurately reflects work and decisions made by me. I have also reviewed documentation completed by clinical staff.     Ervin Cherry DO, [address]  2/21/2018 4:41 PM      Electronically signed by Radha Eduardo DO, FAOAO on 2/21/2018 at 4:40 PM

## 2018-02-23 ENCOUNTER — HOSPITAL ENCOUNTER (OUTPATIENT)
Dept: PHYSICAL THERAPY | Age: 53
Setting detail: THERAPIES SERIES
Discharge: HOME | End: 2018-02-23
Payer: COMMERCIAL

## 2018-02-23 PROCEDURE — G0283 ELEC STIM OTHER THAN WOUND: HCPCS

## 2018-02-23 PROCEDURE — 97110 THERAPEUTIC EXERCISES: CPT

## 2018-02-26 ENCOUNTER — HOSPITAL ENCOUNTER (OUTPATIENT)
Dept: PHYSICAL THERAPY | Age: 53
Setting detail: THERAPIES SERIES
Discharge: HOME | End: 2018-02-26
Payer: COMMERCIAL

## 2018-02-26 PROCEDURE — 97140 MANUAL THERAPY 1/> REGIONS: CPT

## 2018-02-26 PROCEDURE — G0283 ELEC STIM OTHER THAN WOUND: HCPCS

## 2018-02-26 PROCEDURE — 97110 THERAPEUTIC EXERCISES: CPT

## 2018-02-26 NOTE — PROGRESS NOTES
Physical Therapy  [address]   Outpatient Physical Therapy  [address] [facility]. [address]  Phone: [phone]  Fax: [phone]  Daily Progress Note    Date: 18    Patient Name: Albania Morales        MRN: [id number]  Account: [de-identified] : 1965      General Information:  Chart Reviewed: Yes  Patient assessed for rehabilitation services?: Yes  Response To Previous Treatment: Patient with no complaints from previous session. Family / Caregiver Present: No  Referring Practitioner: Umair Cho DO   Referral Date : 18  Diagnosis: Complete tear of right rotator cuff (M75.121) Impingement syndrome of right shoulder (M75.41) Adhesive capsulitis of right shoulder (M75.01)   Follows Commands: Within Functional Limits  Other (Comment): Surgery date 2018 (6 weeks post)   Onset Date: 18  PT Insurance Information: Worker's Comp  Total # of Visits Approved: 24  Total # of Visits to Date: 23  Plan of Care/Certification Expiration Date: 18  No Show: 0  Canceled Appointment: 0    Subjective  Patient reports some soreness and tenderness \"in the joint\" but no pain at this time. Pain  Patient Currently in Pain: No    Objective:  Exercise 8: Supine PROM (R) shoulder elevation, ER and IR 30 sec hold x 3 reps with each one. Exercise 9: Supine Dynamic stabilization flexion/extension @ 100 degrees (shoulder) 20 reps   Exercise 10: Supine Dynamic stabilization ER/IR in scapular plane 20 reps   Exercise 13: Supine AAROM shoulder flexion (scapular plane) 10 reps   Exercise 15: Prone Rows (R) UE 3 lbs 15 reps x 3 sets   Exercise 16: Prone horizontal ABD (R) UE with thumb down 15 reps x 3 sets   Exercise 17: Seated Biceps Curl 5 lbs 15 reps x 3 sets  Exercise 18:  Scapular retraction with (B) ER 10 reps x 3 sets   Exercise 19: Side lying ER with towel 10 reps x 3 sets     Modalities   Cryotherapy (Minutes\Location): 15 minutes  E-stim (parameters): E-stim IFC x 15 minutes to the

## 2018-02-28 ENCOUNTER — HOSPITAL ENCOUNTER (OUTPATIENT)
Dept: PHYSICAL THERAPY | Age: 53
Setting detail: THERAPIES SERIES
Discharge: HOME | End: 2018-02-28
Payer: COMMERCIAL

## 2018-02-28 PROCEDURE — 97110 THERAPEUTIC EXERCISES: CPT

## 2018-02-28 PROCEDURE — 97140 MANUAL THERAPY 1/> REGIONS: CPT

## 2018-02-28 PROCEDURE — G0283 ELEC STIM OTHER THAN WOUND: HCPCS

## 2018-03-01 NOTE — PROGRESS NOTES
Physical Therapy  [address]   Outpatient Physical Therapy  [address] [facility]. [address]  Phone: [phone]  Fax: [phone]  Daily Progress Note    Date: 18    Patient Name: Yahir Ulrich        MRN: [id number]  Account: [de-identified] : 18 1558   General   Chart Reviewed Yes   Patient assessed for rehabilitation services? Yes   Referring Practitioner Sandee Torrez DO    Referral Date  18   Diagnosis Complete tear of right rotator cuff (M75.121) Impingement syndrome of right shoulder (M75.41) Adhesive capsulitis of right shoulder (M75.01)    Follows Commands WFL   Other (Comment) Surgery date 2018 (6 1/2 weeks post)    PT Visit Information   Onset Date 18   PT Insurance Information Worker's Comp   Total # of Visits Approved 24   Total # of Visits to Date 20   Plan of Care/Certification Expiration Date 18   No Show 0   Canceled Appointment 0   Subjective   Subjective Patient reports feeling sore after last appointment. notes improved mobility by the day. states when he sleeps he wakes up with his arm in an ER and ABDucted position and is usually sore and hurting.  educated to try using pillows and brace arm to prevent from getting in that postion at night. Pain Screening   Patient Currently in Pain No   Exercises   Exercise 8 Supine PROM (R) shoulder elevation, ER and IR 30 sec hold x 3 reps with each one.     Exercise 9 Supine Dynamic stabilization flexion/extension @ 100 degrees (shoulder) 20 reps    Exercise 10 Supine Dynamic stabilization ER/IR in scapular plane 20 reps    Exercise 13 Supine AAROM shoulder flexion (scapular plane) 10 reps    Exercise 15 Prone Rows (R) UE 3 lbs 15 reps x 3 sets    Exercise 16 Prone horizontal ABD (R) UE with thumb down 15 reps x 3 sets    Exercise 17 Seated Biceps Curl 5 lbs 15 reps x 3 sets   Exercise 18 Scapular retraction with (B) ER 10 reps x 3 sets    Exercise 19 Side lying ER with towel 10 reps

## 2018-03-02 ENCOUNTER — HOSPITAL ENCOUNTER (OUTPATIENT)
Dept: PHYSICAL THERAPY | Age: 53
Setting detail: THERAPIES SERIES
Discharge: HOME | End: 2018-03-02
Payer: COMMERCIAL

## 2018-03-02 PROCEDURE — 97110 THERAPEUTIC EXERCISES: CPT

## 2018-03-05 ENCOUNTER — HOSPITAL ENCOUNTER (OUTPATIENT)
Dept: PHYSICAL THERAPY | Age: 53
Setting detail: THERAPIES SERIES
Discharge: HOME | End: 2018-03-05
Payer: COMMERCIAL

## 2018-03-05 PROCEDURE — 97140 MANUAL THERAPY 1/> REGIONS: CPT

## 2018-03-05 PROCEDURE — 97110 THERAPEUTIC EXERCISES: CPT

## 2018-03-05 NOTE — PROGRESS NOTES
Physical Therapy  [address]   Outpatient Physical Therapy  [address] [facility]. [address]  Phone: [phone]  Fax: [phone]  Daily Progress Note    Date: 3/5/18    Patient Name: Juan Silva        MRN: [id number]  Account: [de-identified] : 1965      General Information:  Referring Practitioner: Almita Flores DO   Referral Date : 18  Diagnosis: Complete tear of right rotator cuff (M75.121) Impingement syndrome of right shoulder (M75.41) Adhesive capsulitis of right shoulder (M75.01)   Follows Commands: Within Functional Limits  Other (Comment): Surgery date 2018 (7 weeks post)   Onset Date: 18  PT Insurance Information: Worker's Comp  Total # of Visits Approved: 36  Total # of Visits to Date:   Plan of Care/Certification Expiration Date: 18  No Show: 0  Canceled Appointment: 0    Subjective:  Subjective: Patient having hernia pains after last treatment that is still bothering him today. notes whats to try all the exercises but decrease the weight today. seeing doctor for hernia pain Wednesday     Pain:  Patient Currently in Pain: No          Objective:  Exercise 8: Supine PROM (R) shoulder elevation, ER and IR 30 sec hold x 3 reps with each one. Exercise 9: Supine Dynamic stabilization flexion/extension @ 100 degrees (shoulder) 20 reps   Exercise 12: Standing Tricep Extension 20 lbs 20 reps x 3 sets   Exercise 13: Standing ER and IR (R2 tubing) 20 reps x 3 sets with each one. Exercise 14: Prone (R) shoulder extension 20 reps x 3 sets   Exercise 15: Prone Rows (R) UE 2 lbs 20 reps x 3 sets   Exercise 16: Prone horizontal ABD (R) UE with thumb down 20 reps x 3 sets   Exercise 17: Seated Biceps Curl 2 lbs 20 reps x 3 sets  Exercise 18:  Scapular retraction with (B) ER 20 reps x 3 sets   Exercise 19: Side lying ER with towel 20 reps x 3 sets   Exercise 20: Seated AAROM Flexion, Abduction, ER and IR and full can scaption  10 reps with each one      Moist heat: before stretching 10 minutes     Assessment: Body structures, Functions, Activity limitations: Decreased ROM; Decreased strength;Decreased functional mobility ; Decreased ADL status  Assessment: No IFC today. decreased weight today due to pain having hernia pains.     Treatment Diagnosis: (R) shoulder pain   Prognosis: Excellent  Activity Tolerance: Patient Tolerated treatment well    Plan:  Plan: Continue with current plan    Therapy Time:  Time In: 1340  Time Out: 2345  Minutes: 63  Timed Code Treatment Minutes: 53 Minutes    Treatment Charges: Minutes Units   []  Ultrasound     []  Electrical-Stim     []  Iontophoresis     []  Traction     []  Massage       []  Eval     []  Gait     [x]  Ther Exercise  43  3   [x]  Manual Therapy  10  1   []  Ther Activities       []  Aquatics     []  Neuro Re-Ed       [x]  Other  HP 10'  0   Total Treatment Time: 48  4       Kenji Sykes, PTA

## 2018-03-07 ENCOUNTER — HOSPITAL ENCOUNTER (OUTPATIENT)
Dept: PHYSICAL THERAPY | Age: 53
Setting detail: THERAPIES SERIES
Discharge: HOME | End: 2018-03-07
Payer: COMMERCIAL

## 2018-03-07 PROCEDURE — 97140 MANUAL THERAPY 1/> REGIONS: CPT

## 2018-03-07 PROCEDURE — 97110 THERAPEUTIC EXERCISES: CPT

## 2018-03-08 NOTE — PROGRESS NOTES
Physical Therapy  [address] [facility]   Outpatient Physical Therapy  [address] [facility]. [address]  Phone: [phone]  Fax: [phone]  Daily Progress Note    Date: 3/7/18    Patient Name: Viji Harper        MRN: [id number]  Account: [de-identified] : 18 1551   General   Referring Practitioner Michaela Andrea DO    Referral Date  18   Diagnosis Complete tear of right rotator cuff (M75.121) Impingement syndrome of right shoulder (M75.41) Adhesive capsulitis of right shoulder (M75.01)    Follows Commands WFL   Other (Comment) Surgery date 2018 (7 weeks post)    PT Visit Information   Onset Date 18   PT Insurance Information Worker's Comp   Total # of Visits Approved 36   Total # of Visits to Date 23   Plan of Care/Certification Expiration Date 18   No Show 0   Canceled Appointment 0   Subjective   Subjective getting CAT scan done of hernia to see the severity and whether or not any intervention is needed. General Comment   Comments decreased weights and reps due to hernia. Pain Screening   Patient Currently in Pain No   Exercises   Exercise 8 Supine PROM (R) shoulder elevation, ER and IR 30 sec hold x 3 reps with each one. Exercise 9 Supine Dynamic stabilization flexion/extension @ 100 degrees (shoulder) 20 reps    Exercise 12 Standing Tricep Extension 20 lbs 20 reps x 3 sets    Exercise 13 Standing ER and IR (R2 tubing) 20 reps x 3 sets with each one.    Exercise 14 Prone (R) shoulder extension 20 reps x 3 sets    Exercise 15 Prone Rows (R) UE 2 lbs 20 reps x 3 sets    Exercise 16 Prone horizontal ABD (R) UE with thumb down 20 reps x 3 sets    Exercise 17 Seated Biceps Curl 2 lbs 20 reps x 3 sets   Exercise 18 Scapular retraction with (B) ER 20 reps x 3 sets    Exercise 19 Side lying ER with towel 20 reps x 3 sets    Exercise 20 Seated AAROM Flexion, Abduction, ER and IR and full can scaption  10 reps with each one    Modalities   Moist heat

## 2018-03-12 ENCOUNTER — HOSPITAL ENCOUNTER (OUTPATIENT)
Dept: PHYSICAL THERAPY | Age: 53
Setting detail: THERAPIES SERIES
Discharge: HOME | End: 2018-03-12
Payer: COMMERCIAL

## 2018-03-12 PROCEDURE — 97110 THERAPEUTIC EXERCISES: CPT

## 2018-03-12 NOTE — PROGRESS NOTES
Diagnosis: (R) shoulder pain   Prognosis: Excellent    Plan  Times per week: 3  Plan weeks: 12  Current Treatment Recommendations: ROM, Home Exercise Program, Patient/Caregiver Education & Training, Modalities, Pain Management    Therapy Time   Individual Concurrent Group Co-treatment   Time In 1345         Time Out 1430         Minutes 45           Treatment Charges: Minutes Units   []  Ultrasound     []  Electrical-Stim     []  Iontophoresis     []  Traction     []  Massage       []  Eval     []  Gait     [x]  Ther Exercise 45  3    []  Manual Therapy       []  Ther Activities       []  Aquatics     []  Neuro Re-Ed       []  Other       Total Treatment Time: 39 3      JULY BarrazaT

## 2018-03-14 ENCOUNTER — HOSPITAL ENCOUNTER (OUTPATIENT)
Dept: PHYSICAL THERAPY | Age: 53
Setting detail: THERAPIES SERIES
Discharge: HOME | End: 2018-03-14
Payer: COMMERCIAL

## 2018-03-14 PROCEDURE — 97110 THERAPEUTIC EXERCISES: CPT

## 2018-03-28 ENCOUNTER — HOSPITAL ENCOUNTER (OUTPATIENT)
Dept: PHYSICAL THERAPY | Age: 53
Setting detail: THERAPIES SERIES
Discharge: HOME | End: 2018-03-28
Payer: COMMERCIAL

## 2018-03-28 PROCEDURE — 97110 THERAPEUTIC EXERCISES: CPT

## 2018-03-30 ENCOUNTER — APPOINTMENT (OUTPATIENT)
Dept: PHYSICAL THERAPY | Age: 53
End: 2018-03-30
Payer: COMMERCIAL

## 2018-04-02 ENCOUNTER — HOSPITAL ENCOUNTER (OUTPATIENT)
Dept: PHYSICAL THERAPY | Age: 53
Setting detail: THERAPIES SERIES
Discharge: HOME | End: 2018-04-02
Payer: COMMERCIAL

## 2018-04-02 PROCEDURE — 97110 THERAPEUTIC EXERCISES: CPT

## 2018-04-04 ENCOUNTER — OFFICE VISIT (OUTPATIENT)
Dept: ORTHOPEDIC SURGERY | Age: 53
End: 2018-04-04

## 2018-04-04 ENCOUNTER — HOSPITAL ENCOUNTER (OUTPATIENT)
Dept: PHYSICAL THERAPY | Age: 53
Setting detail: THERAPIES SERIES
Discharge: HOME | End: 2018-04-04
Payer: COMMERCIAL

## 2018-04-04 VITALS
SYSTOLIC BLOOD PRESSURE: 145 MMHG | WEIGHT: 294.6 LBS | HEIGHT: 72 IN | HEART RATE: 72 BPM | BODY MASS INDEX: 39.9 KG/M2 | DIASTOLIC BLOOD PRESSURE: 90 MMHG

## 2018-04-04 DIAGNOSIS — M75.121 COMPLETE TEAR OF RIGHT ROTATOR CUFF: Primary | ICD-10-CM

## 2018-04-04 PROCEDURE — 99024 POSTOP FOLLOW-UP VISIT: CPT | Performed by: ORTHOPAEDIC SURGERY

## 2018-04-04 PROCEDURE — 97110 THERAPEUTIC EXERCISES: CPT

## 2018-04-04 ASSESSMENT — ENCOUNTER SYMPTOMS
NAUSEA: 0
DIARRHEA: 0
COUGH: 0
CONSTIPATION: 0

## 2018-04-06 ENCOUNTER — HOSPITAL ENCOUNTER (OUTPATIENT)
Dept: PHYSICAL THERAPY | Age: 53
Setting detail: THERAPIES SERIES
Discharge: HOME | End: 2018-04-06
Payer: COMMERCIAL

## 2018-04-06 PROCEDURE — 97110 THERAPEUTIC EXERCISES: CPT

## 2018-04-09 ENCOUNTER — HOSPITAL ENCOUNTER (OUTPATIENT)
Dept: PHYSICAL THERAPY | Age: 53
Setting detail: THERAPIES SERIES
Discharge: HOME | End: 2018-04-09
Payer: COMMERCIAL

## 2018-04-09 PROCEDURE — 97110 THERAPEUTIC EXERCISES: CPT

## 2018-04-11 ENCOUNTER — HOSPITAL ENCOUNTER (OUTPATIENT)
Dept: PHYSICAL THERAPY | Age: 53
Setting detail: THERAPIES SERIES
Discharge: HOME | End: 2018-04-11
Payer: COMMERCIAL

## 2018-04-11 PROCEDURE — 97110 THERAPEUTIC EXERCISES: CPT

## 2018-04-13 ENCOUNTER — HOSPITAL ENCOUNTER (OUTPATIENT)
Dept: PHYSICAL THERAPY | Age: 53
Setting detail: THERAPIES SERIES
Discharge: HOME | End: 2018-04-13
Payer: COMMERCIAL

## 2018-04-13 PROCEDURE — 97110 THERAPEUTIC EXERCISES: CPT

## 2018-04-27 ENCOUNTER — APPOINTMENT (OUTPATIENT)
Dept: PHYSICAL THERAPY | Age: 53
End: 2018-04-27
Payer: COMMERCIAL

## 2018-04-27 PROBLEM — E11.8 TYPE 2 DIABETES MELLITUS WITH COMPLICATION, WITHOUT LONG-TERM CURRENT USE OF INSULIN (HCC): Status: ACTIVE | Noted: 2018-04-27

## 2018-04-27 PROBLEM — E78.1 HYPERTRIGLYCERIDEMIA WITHOUT HYPERCHOLESTEROLEMIA: Status: ACTIVE | Noted: 2018-04-27

## 2018-05-01 ENCOUNTER — HOSPITAL ENCOUNTER (OUTPATIENT)
Dept: PHYSICAL THERAPY | Age: 53
Setting detail: THERAPIES SERIES
Discharge: HOME | End: 2018-05-01
Payer: COMMERCIAL

## 2018-05-01 PROCEDURE — 97110 THERAPEUTIC EXERCISES: CPT

## 2018-05-04 ENCOUNTER — HOSPITAL ENCOUNTER (OUTPATIENT)
Dept: PHYSICAL THERAPY | Age: 53
Setting detail: THERAPIES SERIES
Discharge: HOME | End: 2018-05-04
Payer: COMMERCIAL

## 2018-05-04 PROCEDURE — 97110 THERAPEUTIC EXERCISES: CPT

## 2018-05-11 ENCOUNTER — HOSPITAL ENCOUNTER (OUTPATIENT)
Dept: PHYSICAL THERAPY | Age: 53
Setting detail: THERAPIES SERIES
Discharge: HOME | End: 2018-05-11
Payer: COMMERCIAL

## 2018-05-11 PROCEDURE — 97110 THERAPEUTIC EXERCISES: CPT

## 2018-05-14 ENCOUNTER — HOSPITAL ENCOUNTER (OUTPATIENT)
Dept: PHYSICAL THERAPY | Age: 53
Setting detail: THERAPIES SERIES
Discharge: HOME | End: 2018-05-14
Payer: COMMERCIAL

## 2018-05-14 PROCEDURE — 97110 THERAPEUTIC EXERCISES: CPT

## 2018-05-22 ENCOUNTER — HOSPITAL ENCOUNTER (OUTPATIENT)
Dept: PHYSICAL THERAPY | Age: 53
Setting detail: THERAPIES SERIES
Discharge: HOME | End: 2018-05-22
Payer: COMMERCIAL

## 2018-05-22 PROCEDURE — 97110 THERAPEUTIC EXERCISES: CPT

## 2018-05-25 ENCOUNTER — HOSPITAL ENCOUNTER (OUTPATIENT)
Dept: PHYSICAL THERAPY | Age: 53
Setting detail: THERAPIES SERIES
Discharge: HOME | End: 2018-05-25
Payer: COMMERCIAL

## 2018-05-25 PROCEDURE — 97110 THERAPEUTIC EXERCISES: CPT

## 2018-06-06 ENCOUNTER — OFFICE VISIT (OUTPATIENT)
Dept: ORTHOPEDIC SURGERY | Age: 53
End: 2018-06-06
Payer: COMMERCIAL

## 2018-06-06 VITALS — HEIGHT: 72 IN | BODY MASS INDEX: 39 KG/M2 | WEIGHT: 287.92 LBS

## 2018-06-06 DIAGNOSIS — M75.121 COMPLETE TEAR OF RIGHT ROTATOR CUFF: Primary | ICD-10-CM

## 2018-06-06 PROCEDURE — 99213 OFFICE O/P EST LOW 20 MIN: CPT | Performed by: ORTHOPAEDIC SURGERY

## 2018-06-06 ASSESSMENT — ENCOUNTER SYMPTOMS
BACK PAIN: 0
WHEEZING: 0
SHORTNESS OF BREATH: 0

## 2018-08-02 ENCOUNTER — HOSPITAL ENCOUNTER (OUTPATIENT)
Age: 53
Discharge: HOME | End: 2018-08-02
Payer: COMMERCIAL

## 2018-08-02 DIAGNOSIS — Z12.5 PROSTATE CANCER SCREENING: ICD-10-CM

## 2018-08-02 DIAGNOSIS — E78.1 HYPERTRIGLYCERIDEMIA WITHOUT HYPERCHOLESTEROLEMIA: ICD-10-CM

## 2018-08-02 LAB
ALBUMIN SERPL-MCNC: 4.1 G/DL (ref 3.5–5.2)
ALBUMIN/GLOBULIN RATIO: ABNORMAL (ref 1–2.5)
ALP BLD-CCNC: 63 U/L (ref 40–129)
ALT SERPL-CCNC: 25 U/L (ref 5–41)
ANION GAP SERPL CALCULATED.3IONS-SCNC: 13 MMOL/L (ref 9–17)
AST SERPL-CCNC: 19 U/L
BILIRUB SERPL-MCNC: 0.24 MG/DL (ref 0.3–1.2)
BUN BLDV-MCNC: 19 MG/DL (ref 6–20)
BUN/CREAT BLD: ABNORMAL (ref 9–20)
CALCIUM SERPL-MCNC: 9.2 MG/DL (ref 8.6–10.4)
CHLORIDE BLD-SCNC: 103 MMOL/L (ref 98–107)
CHOLESTEROL/HDL RATIO: 3.6
CHOLESTEROL: 142 MG/DL
CO2: 26 MMOL/L (ref 20–31)
CREAT SERPL-MCNC: 0.82 MG/DL (ref 0.7–1.2)
GFR AFRICAN AMERICAN: >60 ML/MIN
GFR NON-AFRICAN AMERICAN: >60 ML/MIN
GFR SERPL CREATININE-BSD FRML MDRD: ABNORMAL ML/MIN/{1.73_M2}
GFR SERPL CREATININE-BSD FRML MDRD: ABNORMAL ML/MIN/{1.73_M2}
GLUCOSE BLD-MCNC: 146 MG/DL (ref 70–99)
HCT VFR BLD CALC: 43.4 % (ref 41–53)
HDLC SERPL-MCNC: 40 MG/DL
HEMOGLOBIN: 14.8 G/DL (ref 13.5–17.5)
LDL CHOLESTEROL: 70 MG/DL (ref 0–130)
MCH RBC QN AUTO: 29.9 PG (ref 26–34)
MCHC RBC AUTO-ENTMCNC: 34 G/DL (ref 31–37)
MCV RBC AUTO: 88 FL (ref 80–100)
NRBC AUTOMATED: NORMAL PER 100 WBC
PDW BLD-RTO: 12.8 % (ref 11.5–14.9)
PLATELET # BLD: 239 K/UL (ref 150–450)
PMV BLD AUTO: 8.5 FL (ref 6–12)
POTASSIUM SERPL-SCNC: 4.4 MMOL/L (ref 3.7–5.3)
PROSTATE SPECIFIC ANTIGEN: 0.81 UG/L
RBC # BLD: 4.93 M/UL (ref 4.5–5.9)
SODIUM BLD-SCNC: 142 MMOL/L (ref 135–144)
TOTAL PROTEIN: 7.5 G/DL (ref 6.4–8.3)
TRIGL SERPL-MCNC: 158 MG/DL
VLDLC SERPL CALC-MCNC: ABNORMAL MG/DL (ref 1–30)
WBC # BLD: 7 K/UL (ref 3.5–11)

## 2018-08-02 PROCEDURE — 80061 LIPID PANEL: CPT

## 2018-08-02 PROCEDURE — G0103 PSA SCREENING: HCPCS

## 2018-08-02 PROCEDURE — 80053 COMPREHEN METABOLIC PANEL: CPT

## 2018-08-02 PROCEDURE — 36415 COLL VENOUS BLD VENIPUNCTURE: CPT

## 2018-08-02 PROCEDURE — 85027 COMPLETE CBC AUTOMATED: CPT

## 2018-08-08 ENCOUNTER — OFFICE VISIT (OUTPATIENT)
Dept: ORTHOPEDIC SURGERY | Age: 53
End: 2018-08-08
Payer: COMMERCIAL

## 2018-08-08 VITALS — HEIGHT: 72 IN | WEIGHT: 285 LBS | BODY MASS INDEX: 38.6 KG/M2

## 2018-08-08 DIAGNOSIS — M75.121 COMPLETE TEAR OF RIGHT ROTATOR CUFF: Primary | ICD-10-CM

## 2018-08-08 PROCEDURE — 99213 OFFICE O/P EST LOW 20 MIN: CPT | Performed by: ORTHOPAEDIC SURGERY

## 2018-08-08 ASSESSMENT — ENCOUNTER SYMPTOMS
WHEEZING: 0
SHORTNESS OF BREATH: 0
BACK PAIN: 0

## 2018-08-08 NOTE — PROGRESS NOTES
the defined types were placed in this encounter.       Electronically signed by Gamaliel Richard DO, FAOAO on 8/8/2018 at 1:55 PM

## 2019-01-09 ENCOUNTER — OFFICE VISIT (OUTPATIENT)
Dept: ORTHOPEDIC SURGERY | Age: 54
End: 2019-01-09
Payer: COMMERCIAL

## 2019-01-09 VITALS — HEIGHT: 72 IN | BODY MASS INDEX: 37.79 KG/M2 | WEIGHT: 279 LBS

## 2019-01-09 DIAGNOSIS — M75.121 COMPLETE TEAR OF RIGHT ROTATOR CUFF: Primary | ICD-10-CM

## 2019-01-09 PROCEDURE — 99213 OFFICE O/P EST LOW 20 MIN: CPT | Performed by: ORTHOPAEDIC SURGERY

## 2019-01-09 ASSESSMENT — ENCOUNTER SYMPTOMS
COUGH: 0
DIARRHEA: 0
NAUSEA: 0
CONSTIPATION: 0

## 2019-02-08 ENCOUNTER — HOSPITAL ENCOUNTER (OUTPATIENT)
Age: 54
Setting detail: SPECIMEN
Discharge: HOME | End: 2019-02-08
Payer: COMMERCIAL

## 2019-02-08 LAB
CREATININE URINE: 67.7 MG/DL (ref 39–259)
MICROALBUMIN/CREAT 24H UR: <12 MG/L
MICROALBUMIN/CREAT UR-RTO: NORMAL MCG/MG CREAT

## 2020-05-08 ENCOUNTER — HOSPITAL ENCOUNTER (OUTPATIENT)
Age: 55
Discharge: HOME | End: 2020-05-08
Payer: COMMERCIAL

## 2020-05-08 LAB
ESTIMATED AVERAGE GLUCOSE: 126 MG/DL
HBA1C MFR BLD: 6 % (ref 4–6)

## 2020-05-08 PROCEDURE — 83036 HEMOGLOBIN GLYCOSYLATED A1C: CPT

## 2020-05-08 PROCEDURE — 36415 COLL VENOUS BLD VENIPUNCTURE: CPT

## 2020-12-04 ENCOUNTER — HOSPITAL ENCOUNTER (OUTPATIENT)
Age: 55
Discharge: HOME | End: 2020-12-04
Payer: COMMERCIAL

## 2020-12-04 LAB
ABSOLUTE EOS #: 0.3 K/UL (ref 0–0.4)
ABSOLUTE IMMATURE GRANULOCYTE: ABNORMAL K/UL (ref 0–0.3)
ABSOLUTE LYMPH #: 1.6 K/UL (ref 1–4.8)
ABSOLUTE MONO #: 0.6 K/UL (ref 0.1–1.3)
ALBUMIN SERPL-MCNC: 4.3 G/DL (ref 3.5–5.2)
ALBUMIN/GLOBULIN RATIO: ABNORMAL (ref 1–2.5)
ALP BLD-CCNC: 53 U/L (ref 40–129)
ALT SERPL-CCNC: 27 U/L (ref 5–41)
ANION GAP SERPL CALCULATED.3IONS-SCNC: 7 MMOL/L (ref 9–17)
AST SERPL-CCNC: 22 U/L
BASOPHILS # BLD: 1 % (ref 0–2)
BASOPHILS ABSOLUTE: 0.1 K/UL (ref 0–0.2)
BILIRUB SERPL-MCNC: 0.49 MG/DL (ref 0.3–1.2)
BUN BLDV-MCNC: 18 MG/DL (ref 6–20)
BUN/CREAT BLD: ABNORMAL (ref 9–20)
CALCIUM SERPL-MCNC: 9.9 MG/DL (ref 8.6–10.4)
CHLORIDE BLD-SCNC: 102 MMOL/L (ref 98–107)
CHOLESTEROL, FASTING: 162 MG/DL
CHOLESTEROL/HDL RATIO: 3.1
CO2: 28 MMOL/L (ref 20–31)
CREAT SERPL-MCNC: 0.96 MG/DL (ref 0.7–1.2)
DIFFERENTIAL TYPE: ABNORMAL
EOSINOPHILS RELATIVE PERCENT: 5 % (ref 0–4)
ESTIMATED AVERAGE GLUCOSE: 123 MG/DL
GFR AFRICAN AMERICAN: >60 ML/MIN
GFR NON-AFRICAN AMERICAN: >60 ML/MIN
GFR SERPL CREATININE-BSD FRML MDRD: ABNORMAL ML/MIN/{1.73_M2}
GFR SERPL CREATININE-BSD FRML MDRD: ABNORMAL ML/MIN/{1.73_M2}
GLUCOSE BLD-MCNC: 109 MG/DL (ref 70–99)
HBA1C MFR BLD: 5.9 % (ref 4–6)
HCT VFR BLD CALC: 44.8 % (ref 41–53)
HDLC SERPL-MCNC: 52 MG/DL
HEMOGLOBIN: 14.9 G/DL (ref 13.5–17.5)
HEPATITIS C ANTIBODY: NONREACTIVE
IMMATURE GRANULOCYTES: ABNORMAL %
LDL CHOLESTEROL: 86 MG/DL (ref 0–130)
LYMPHOCYTES # BLD: 26 % (ref 24–44)
MCH RBC QN AUTO: 30.1 PG (ref 26–34)
MCHC RBC AUTO-ENTMCNC: 33.4 G/DL (ref 31–37)
MCV RBC AUTO: 90.2 FL (ref 80–100)
MONOCYTES # BLD: 10 % (ref 1–7)
NRBC AUTOMATED: ABNORMAL PER 100 WBC
PDW BLD-RTO: 13.4 % (ref 11.5–14.9)
PLATELET # BLD: 187 K/UL (ref 150–450)
PLATELET ESTIMATE: ABNORMAL
PMV BLD AUTO: 8.3 FL (ref 6–12)
POTASSIUM SERPL-SCNC: 4.7 MMOL/L (ref 3.7–5.3)
RBC # BLD: 4.96 M/UL (ref 4.5–5.9)
RBC # BLD: ABNORMAL 10*6/UL
SEG NEUTROPHILS: 58 % (ref 36–66)
SEGMENTED NEUTROPHILS ABSOLUTE COUNT: 3.5 K/UL (ref 1.3–9.1)
SODIUM BLD-SCNC: 137 MMOL/L (ref 135–144)
TOTAL PROTEIN: 7 G/DL (ref 6.4–8.3)
TRIGLYCERIDE, FASTING: 120 MG/DL
VLDLC SERPL CALC-MCNC: NORMAL MG/DL (ref 1–30)
WBC # BLD: 6.1 K/UL (ref 3.5–11)
WBC # BLD: ABNORMAL 10*3/UL

## 2020-12-04 PROCEDURE — 85025 COMPLETE CBC W/AUTO DIFF WBC: CPT

## 2020-12-04 PROCEDURE — 86803 HEPATITIS C AB TEST: CPT

## 2020-12-04 PROCEDURE — 36415 COLL VENOUS BLD VENIPUNCTURE: CPT

## 2020-12-04 PROCEDURE — 80053 COMPREHEN METABOLIC PANEL: CPT

## 2020-12-04 PROCEDURE — 83036 HEMOGLOBIN GLYCOSYLATED A1C: CPT

## 2020-12-04 PROCEDURE — 80061 LIPID PANEL: CPT

## 2021-09-28 ENCOUNTER — HOSPITAL ENCOUNTER (EMERGENCY)
Age: 56
Discharge: HOME | End: 2021-09-28
Payer: COMMERCIAL

## 2021-09-28 ENCOUNTER — APPOINTMENT (OUTPATIENT)
Dept: GENERAL RADIOLOGY | Age: 56
End: 2021-09-28
Payer: COMMERCIAL

## 2021-09-28 VITALS
RESPIRATION RATE: 18 BRPM | SYSTOLIC BLOOD PRESSURE: 109 MMHG | TEMPERATURE: 97.2 F | HEART RATE: 76 BPM | OXYGEN SATURATION: 98 % | DIASTOLIC BLOOD PRESSURE: 76 MMHG

## 2021-09-28 DIAGNOSIS — S61.213A LACERATION OF LEFT MIDDLE FINGER WITHOUT FOREIGN BODY WITHOUT DAMAGE TO NAIL, INITIAL ENCOUNTER: Primary | ICD-10-CM

## 2021-09-28 PROCEDURE — 99282 EMERGENCY DEPT VISIT SF MDM: CPT

## 2021-09-28 PROCEDURE — 90471 IMMUNIZATION ADMIN: CPT | Performed by: EMERGENCY MEDICINE

## 2021-09-28 PROCEDURE — 90715 TDAP VACCINE 7 YRS/> IM: CPT | Performed by: EMERGENCY MEDICINE

## 2021-09-28 PROCEDURE — 73130 X-RAY EXAM OF HAND: CPT

## 2021-09-28 PROCEDURE — 12001 RPR S/N/AX/GEN/TRNK 2.5CM/<: CPT

## 2021-09-28 PROCEDURE — 6360000002 HC RX W HCPCS: Performed by: EMERGENCY MEDICINE

## 2021-09-28 RX ORDER — CEPHALEXIN 500 MG/1
500 CAPSULE ORAL 2 TIMES DAILY
Qty: 14 CAPSULE | Refills: 0 | Status: SHIPPED | OUTPATIENT
Start: 2021-09-28 | End: 2021-10-05

## 2021-09-28 RX ADMIN — TETANUS TOXOID, REDUCED DIPHTHERIA TOXOID AND ACELLULAR PERTUSSIS VACCINE, ADSORBED 0.5 ML: 5; 2.5; 8; 8; 2.5 SUSPENSION INTRAMUSCULAR at 11:06

## 2021-09-28 ASSESSMENT — ENCOUNTER SYMPTOMS
EYES NEGATIVE: 1
RESPIRATORY NEGATIVE: 1
GASTROINTESTINAL NEGATIVE: 1

## 2021-09-28 NOTE — ED NOTES
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       Edilberto Arciniega, FELICITY  09/28/21 2341

## 2021-09-28 NOTE — ED PROVIDER NOTES
[facility] ED  EMERGENCY DEPARTMENT ENCOUNTER      Pt Name: Iliana Kiran  MRN: [id number]  [place] 1965  Date of evaluation: 9/28/2021  Provider: PILO Tenorio PA-C    CHIEF COMPLAINT     Chief Complaint   Patient presents with    Laceration     left middle finger caughter on  at work         HISTORY OF PRESENT ILLNESS   (Location/Symptom, Timing/Onset, Context/Setting,Quality, Duration, Modifying Factors, Severity)  Note limiting factors. Iliana Kiran is a63 y.o. male who presents to the emergency department      Pleasant 76-year-old male presents here chief complaint of a laceration to the PIP joint of the left middle finger. He was using a  at work and accidentally slipped and caught his finger. Superficial circumferential laceration to the dorsal aspect of the left middle finger. He has full range of motion no deep structure damage. He is not up-to-date on tetanus immunization. The injury occurred just prior to arrival.          Nursing Notes werereviewed. REVIEW OF SYSTEMS    (2-9 systems for level 4, 10 or more for level 5)     Review of Systems   Constitutional: Negative. HENT: Negative. Eyes: Negative. Respiratory: Negative. Cardiovascular: Negative. Gastrointestinal: Negative. Endocrine: Negative. Genitourinary: Negative. Musculoskeletal: Negative. Neurological: Negative. Psychiatric/Behavioral: Negative. All other systems reviewed and are negative. Except as noted above the remainder of the review of systems was reviewed and negative.        PAST MEDICAL HISTORY     Past Medical History:   Diagnosis Date    Abscess of axilla     Asthma     as child    Diabetes mellitus ([facility] [address]     dx 6 months ago    HTN (hypertension) 11/14/2013    Sciatica 3/18/2014    Shoulder pain, right     Snores     denies apnea         SURGICALHISTORY       Past Surgical History:   Procedure Laterality Date    AXILLARY SURGERY Right     abscess (local anesthesia)    MS SHLDR ARTHROSCOP,PART DEBRIDE Right 1/12/2018    SHOULDER ARTHROSCOPY, ROTATOR CUFF REPAIR, POSSIBLE SUBACHROMIAL DECOMPRESSION, ARTHREX, NSA= INTERSCALENE BLOCK., SPIDER TABLE (SEMI SITTING POSITION) performed by Harrison Elizabeth DO at [address]      L arm    SHOULDER ARTHROSCOPY Right 01/12/2018     SHOULDER ARTHROSCOPY, ROTATOR CUFF REPAIR, POSSIBLE SUBACHROMIAL         CURRENT MEDICATIONS       Previous Medications    ALBUTEROL (PROVENTIL) (2.5 MG/3ML) 0.083% NEBULIZER SOLUTION    Take 3 mLs by nebulization every 6 hours as needed for Wheezing    ALBUTEROL SULFATE HFA (PROAIR HFA) 108 (90 BASE) MCG/ACT INHALER    Inhale 2 puffs into the lungs every 6 hours as needed for Wheezing    ASPIRIN 81 MG EC TABLET    Take 81 mg by mouth daily    LISINOPRIL (PRINIVIL;ZESTRIL) 20 MG TABLET    TAKE 1 TABLET DAILY    METFORMIN (GLUCOPHAGE-XR) 500 MG EXTENDED RELEASE TABLET    Take 2 tablets by mouth daily (with breakfast)    TRULICITY 5.33 VD/6.3VI SOPN    INJECT 0.75 MG UNDER THE SKIN ONCE A WEEK         ALLERGIES   Patient has no known allergies.     FAMILY HISTORY       Family History   Problem Relation Age of Onset    Heart Disease Mother     Diabetes Mother         type 2    Cancer Father         mouth    Pacemaker Father         aneurysm of stomach          SOCIAL HISTORY       Social History     Socioeconomic History    Marital status:      Spouse name: Not on file    Number of children: Not on file    Years of education: Not on file    Highest education level: Not on file   Occupational History    Not on file   Tobacco Use    Smoking status: Never Smoker    Smokeless tobacco: Never Used   Substance and Sexual Activity    Alcohol use: Yes     Comment: rarely    Drug use: No    Sexual activity: Not on file   Other Topics Concern    Not on file   Social History Narrative    Not on file     Social Determinants of Health     Financial Resource Strain:     Difficulty of Paying Living Expenses:    Food Insecurity:     Worried About Running Out of Food in the Last Year:     [address] in the Last Year:    Transportation Needs:     Lack of Transportation (Medical):  Lack of Transportation (Non-Medical):    Physical Activity:     Days of Exercise per Week:     Minutes of Exercise per Session:    Stress:     Feeling of Stress :    Social Connections:     Frequency of Communication with Friends and Family:     Frequency of Social Gatherings with Friends and Family:     Attends Mu-ism Services:     Active Member of Clubs or Organizations:     Attends Club or Organization Meetings:     Marital Status:    Intimate Partner Violence:     Fear of Current or Ex-Partner:     Emotionally Abused:     Physically Abused:     Sexually Abused:        SCREENINGS             PHYSICAL EXAM    (up to 7 for level 4, 8 or more for level 5)     ED Triage Vitals [09/28/21 1042]   BP Temp Temp Source Pulse Resp SpO2 Height Weight   109/76 97.2 °F (36.2 °C) Tympanic 76 18 98 % -- --       Physical Exam  Vitals and nursing note reviewed. Constitutional:       Appearance: Normal appearance. HENT:      Head: Normocephalic and atraumatic. Right Ear: Tympanic membrane and ear canal normal.      Left Ear: Tympanic membrane and ear canal normal.      Nose: Nose normal.      Mouth/Throat:      Mouth: Mucous membranes are dry. Pharynx: Oropharynx is clear. Eyes:      Extraocular Movements: Extraocular movements intact. Conjunctiva/sclera: Conjunctivae normal.      Pupils: Pupils are equal, round, and reactive to light. Cardiovascular:      Rate and Rhythm: Normal rate and regular rhythm. Pulmonary:      Effort: Pulmonary effort is normal.      Breath sounds: Normal breath sounds. Abdominal:      General: Abdomen is flat. Bowel sounds are normal.      Palpations: Abdomen is soft. Musculoskeletal:         General: Normal range of motion. Cervical back: Normal range of motion and neck supple. Skin:     General: Skin is warm and dry. Capillary Refill: Capillary refill takes 2 to 3 seconds. Comments: 2 cm laceration of the PIP joint of the left middle finger, no foreign body, no deep structure damage, full range of motion   Neurological:      General: No focal deficit present. Mental Status: He is alert and oriented to person, place, and time. Mental status is at baseline. Psychiatric:         Mood and Affect: Mood normal.         Behavior: Behavior normal.         Thought Content: Thought content normal.         Judgment: Judgment normal.         DIAGNOSTIC RESULTS     EKG: All EKG's are interpreted by the Emergency Department Physician who either signs orCo-signs this chart in the absence of a cardiologist.      RADIOLOGY:   Non-plainfilm images such as CT, Ultrasound and MRI are read by the radiologist. Plain radiographic images are visualized and preliminarily interpreted by the emergency physician with the below findings:      Interpretationper the Radiologist below, if available at the time of this note:    XR HAND LEFT (MIN 3 VIEWS)   Final Result   No acute osseous abnormality or foreign body identified. ED BEDSIDE ULTRASOUND:   Performed by ED Physician - none    LABS:  Labs Reviewed - No data to display    All other labs were within normal range or not returned as of this dictation. EMERGENCY DEPARTMENT COURSE and DIFFERENTIAL DIAGNOSIS/MDM:   Vitals:    Vitals:    09/28/21 1042   BP: 109/76   Pulse: 76   Resp: 18   Temp: 97.2 °F (36.2 °C)   TempSrc: Tympanic   SpO2: 98%         MDM  Number of Diagnoses or Management Options  Laceration of left middle finger without foreign body without damage to nail, initial encounter  Diagnosis management comments: Patient presented with accidental injury to the left and a finger. Wound was cleaned irrigated with dilute normal saline.   Anesthetized with 1% lidocaine solution locally. 4 simple sutures 4-0 Ethilon used to reapproximate the area. Wound reapproximated well. Dressing applied by nursing staff with splint. Extremity neurovascular intact before and after splint application. Instructions were given to the patient. Patient will follow up with Lauri Darnell for suture removal.            Procedures    FINAL IMPRESSION      1. Laceration of left middle finger without foreign body without damage to nail, initial encounter        DISPOSITION/PLAN   DISPOSITION Decision To Discharge 09/28/2021 11:48:24 AM      PATIENT REFERRED TO:  [address]  [phone]  In 2 days  For wound re-check      DISCHARGE MEDICATIONS:  New Prescriptions    CEPHALEXIN (KEFLEX) 500 MG CAPSULE    Take 1 capsule by mouth 2 times daily for 7 days              Summation      Patient Course:      ED Medications administered this visit:    Medications   Tetanus-Diphth-Acell Pertussis (BOOSTRIX) injection 0.5 mL (0.5 mLs IntraMUSCular Given 9/28/21 1106)       New Prescriptions from this visit:    New Prescriptions    CEPHALEXIN (KEFLEX) 500 MG CAPSULE    Take 1 capsule by mouth 2 times daily for 7 days       Follow-up:  [address]  [phone]  In 2 days  For wound re-check        Final Impression:   1.  Laceration of left middle finger without foreign body without damage to nail, initial encounter               (Please note that portions of this note were completed with a voice recognition program.  Efforts were made to edit the dictations but occasionally words are mis-transcribed.)         Gena Colón PA-C  09/28/21 [id number]

## 2024-05-29 ENCOUNTER — HOSPITAL ENCOUNTER (EMERGENCY)
Age: 59
Discharge: HOME | End: 2024-05-29
Payer: COMMERCIAL

## 2024-05-29 ENCOUNTER — APPOINTMENT (OUTPATIENT)
Dept: GENERAL RADIOLOGY | Age: 59
End: 2024-05-29
Payer: COMMERCIAL

## 2024-05-29 VITALS
HEART RATE: 77 BPM | TEMPERATURE: 97.7 F | HEIGHT: 72 IN | BODY MASS INDEX: 37.93 KG/M2 | SYSTOLIC BLOOD PRESSURE: 152 MMHG | DIASTOLIC BLOOD PRESSURE: 98 MMHG | OXYGEN SATURATION: 95 % | RESPIRATION RATE: 18 BRPM | WEIGHT: 280 LBS

## 2024-05-29 DIAGNOSIS — S63.297A: ICD-10-CM

## 2024-05-29 DIAGNOSIS — S67.195A CRUSHING INJURY OF LEFT RING FINGER, INITIAL ENCOUNTER: Primary | ICD-10-CM

## 2024-05-29 PROCEDURE — 73140 X-RAY EXAM OF FINGER(S): CPT

## 2024-05-29 PROCEDURE — 99283 EMERGENCY DEPT VISIT LOW MDM: CPT

## 2024-05-29 PROCEDURE — 26770 TREAT FINGER DISLOCATION: CPT

## 2024-05-29 PROCEDURE — 82075 ASSAY OF BREATH ETHANOL: CPT

## 2024-05-29 RX ORDER — BACITRACIN ZINC 500 [USP'U]/G
OINTMENT TOPICAL ONCE
Status: DISCONTINUED | OUTPATIENT
Start: 2024-05-29 | End: 2024-05-29 | Stop reason: HOSPADM

## 2024-05-29 RX ORDER — LIDOCAINE HYDROCHLORIDE 10 MG/ML
5 INJECTION, SOLUTION EPIDURAL; INFILTRATION; INTRACAUDAL; PERINEURAL ONCE
Status: DISCONTINUED | OUTPATIENT
Start: 2024-05-29 | End: 2024-05-29 | Stop reason: HOSPADM

## 2024-05-29 NOTE — ED PROVIDER NOTES
[facility] ED  EMERGENCY DEPARTMENT ENCOUNTER      Pt Name: Olman Yuen  MRN: [id number]  Birthdate 1965  Date of evaluation: 5/29/2024  Provider: PRISCILLA Pickens - CNP  8:56 AM    CHIEF COMPLAINT       Chief Complaint   Patient presents with    Hand Injury     Left hand 5th digit injury from freezer door at work smashing finger, 45mins PTA.           HISTORY OF PRESENT ILLNESS        Olman Yuen 58 yo male presents from work to ED with c/o crush injjury left 5th finger 2nd to being closed in freezer door when eye on door did not reset. Distal finger flexed with swelling. No previous injury to finger. Left hand dominant.     The history is provided by the patient. No  was used.       Nursing Notes were reviewed.    REVIEW OF SYSTEMS       Review of Systems   Musculoskeletal:  Positive for arthralgias, joint swelling and myalgias.        Left 5th finger middle/distal phalanx.   All other systems reviewed and are negative.      Except as noted above the remainder of the review of systems was reviewed and negative.       PAST MEDICAL HISTORY     Past Medical History:   Diagnosis Date    Abscess of axilla     Asthma     as child    Complete tear of right rotator cuff     Diabetes mellitus (HCC)     dx 6 months ago    HTN (hypertension) 11/14/2013    Sciatica 3/18/2014    Shoulder pain, right     Snores     denies apnea         SURGICAL HISTORY       Past Surgical History:   Procedure Laterality Date    AXILLARY SURGERY Right     abscess (local anesthesia)    RI SURGICAL ARTHROSCOPY SHOULDER LMTD DBRDMT 1/2 Right 1/12/2018    SHOULDER ARTHROSCOPY, ROTATOR CUFF REPAIR, POSSIBLE SUBACHROMIAL DECOMPRESSION, ARTHREX, NSA= INTERSCALENE BLOCK., SPIDER TABLE (SEMI SITTING POSITION) performed by Boaz Santos DO at [facility] OR    ROTATOR CUFF REPAIR      L arm    SHOULDER ARTHROSCOPY Right 01/12/2018     SHOULDER ARTHROSCOPY, ROTATOR CUFF REPAIR, POSSIBLE SUBACHROMIAL

## 2024-05-29 NOTE — DISCHARGE INSTRUCTIONS
Ice and elevate left 5th finger every 2 hours while awake x 2 days  Splint at all times except to wash finger 1 x daily.  Ibuprofen that ou have at home every 8 hours as needed for pain/swelling.

## (undated) DEVICE — SMARTSTITCH PERFECTPASSER CONNECTOR: Brand: PERFECTPASSER

## (undated) DEVICE — GLOVE SURG SZ 65 THK91MIL LTX FREE SYN POLYISOPRENE

## (undated) DEVICE — PROTECTOR ULN NRV PUR FOAM HK LOOP STRP ANATOMICALLY

## (undated) DEVICE — SUTURE ETHLN SZ 3-0 L18IN NONABSORBABLE BLK L24MM PS-1 3/8 1663G

## (undated) DEVICE — GLOVE ORANGE PI 8 1/2   MSG9085

## (undated) DEVICE — CHLORAPREP 26ML ORANGE

## (undated) DEVICE — SHOULDER STABILIZATION KIT,                                    DISPOSABLE 12 PER BOX

## (undated) DEVICE — YANKAUER,FLEXIBLE HANDLE,REGLR CAPACITY: Brand: MEDLINE INDUSTRIES, INC.

## (undated) DEVICE — STERLING XTRASHARP SHAVER GREAT WHITE SHAVER BLADE, 4.2 MM: Brand: STERLING XTRASHARP SHAVER GREAT WHITE

## (undated) DEVICE — GLOVE ORANGE PI 7 1/2   MSG9075

## (undated) DEVICE — STERLING OVAL BUR, 4.0 MM: Brand: STERLING

## (undated) DEVICE — AMBIENT SUPER TURBOVAC 90: Brand: COBLATION

## (undated) DEVICE — GLOVE ORANGE PI 7   MSG9070

## (undated) DEVICE — Z INACTIVE USE 2660664 SOLUTION IRRIG 3000ML 0.9% SOD CHL USP UROMATIC PLAS CONT

## (undated) DEVICE — T-MAX DISPOSABLE FACE MASK 8 PER BOX

## (undated) DEVICE — Device

## (undated) DEVICE — CANNULA ARTHSCP L7CM ID8.25MM TRNSLUC THRD FLX W/ NO SQUIRT

## (undated) DEVICE — 3M™ IOBAN™ 2 ANTIMICROBIAL INCISE DRAPE 6650EZ: Brand: IOBAN™ 2

## (undated) DEVICE — PACK PROCEDURE SURG SVMMC SHLDR ARTHRO PK

## (undated) DEVICE — GLOVE ORANGE PI 8   MSG9080

## (undated) DEVICE — Z DISCONTINUED USE 2275686 GLOVE SURG SZ 8 L12IN FNGR THK13MIL WHT ISOLEX POLYISOPRENE

## (undated) DEVICE — GOWN,AURORA,NONRNF,XL,30/CS: Brand: MEDLINE

## (undated) DEVICE — ASTOUND STANDARD SURGICAL GOWN, XL: Brand: CONVERTORS

## (undated) DEVICE — SMARTSTITCH PERFECTPASSER                                    MAGNUMWIRE SUTURE CARTRIDGES, BLUE CO-BRAID: Brand: SMARTSTITCH PERFECTPASSER

## (undated) DEVICE — TUBING, SUCTION, 9/32" X 20', STRAIGHT: Brand: MEDLINE INDUSTRIES, INC.

## (undated) DEVICE — DRAPE,SHOULDER,BEACH CHAIR,STERILE: Brand: MEDLINE

## (undated) DEVICE — TURBOVAC REPROCESS 90 SUPER

## (undated) DEVICE — IMMOBILIZER SHLDR L10.5-17IN D7IN SLNG W/ 15DEG ABD PLLW

## (undated) DEVICE — DRAPE,U/ SHT,SPLIT,PLAS,STERIL: Brand: MEDLINE

## (undated) DEVICE — SOLUTION IV IRRIG POUR BRL 0.9% SODIUM CHL 2F7124

## (undated) DEVICE — T-MAX MASK STRAPS PAIRS